# Patient Record
Sex: FEMALE | Race: WHITE | NOT HISPANIC OR LATINO | Employment: OTHER | URBAN - METROPOLITAN AREA
[De-identification: names, ages, dates, MRNs, and addresses within clinical notes are randomized per-mention and may not be internally consistent; named-entity substitution may affect disease eponyms.]

---

## 2017-08-02 ENCOUNTER — THERAPY VISIT (OUTPATIENT)
Dept: PHYSICAL THERAPY | Facility: CLINIC | Age: 67
End: 2017-08-02
Payer: MEDICARE

## 2017-08-02 DIAGNOSIS — M25.511 ACUTE PAIN OF RIGHT SHOULDER: ICD-10-CM

## 2017-08-02 PROCEDURE — 97161 PT EVAL LOW COMPLEX 20 MIN: CPT | Mod: GP | Performed by: PHYSICAL THERAPIST

## 2017-08-02 PROCEDURE — G8987 SELF CARE CURRENT STATUS: HCPCS | Mod: GP | Performed by: PHYSICAL THERAPIST

## 2017-08-02 PROCEDURE — 97110 THERAPEUTIC EXERCISES: CPT | Mod: GP | Performed by: PHYSICAL THERAPIST

## 2017-08-02 PROCEDURE — G8988 SELF CARE GOAL STATUS: HCPCS | Mod: GP | Performed by: PHYSICAL THERAPIST

## 2017-08-02 NOTE — LETTER
DEPARTMENT OF HEALTH AND HUMAN SERVICES  [facility]ARE & MEDICAID SERVICES    PLAN/UPDATED PLAN OF PROGRESS FOR OUTPATIENT REHABILITATION    PATIENTS NAME:  Mihaela Pro     : 1950    PROVIDER NUMBER:    [id number]    [facility]  [id number]    PROVIDER NAME: [facility]    MEDICAL RECORD NUMBER: [id number]     START OF CARE DATE:  SOC Date: 17     TYPE:  PT    PRIMARY/TREATMENT DIAGNOSIS: (Pertinent Medical Diagnosis)  Acute pain of right shoulder     VISITS FROM START OF CARE:  Rxs Used: 1     Subjective:  Patient is a 66 year old female presenting with rehab right shoulder hpi. The history is provided by the patient. No  was used.   Mihaela Pro is a 66 year old female with a right shoulder condition.  Condition occurred with:  Repetition/overuse (post surgical ).  Condition occurred: for unknown reasons.  This is a new condition  May 13, 2017.    Patient reports pain:  Lateral, anterior and medial.  Radiates to:  Shoulder.  Pain is described as aching and is intermittent and reported as 5/10.  Associated symptoms:  Loss of motion/stiffness, painful arc and loss of strength. Pain is worse during the night.  Symptoms are exacerbated by using arm behind back, using arm overhead, using arm at shoulder level, lifting, lying on extremity and carrying (limited use due to wearing a sling yet. ) and relieved by rest, ice and bracing/immobilizing.  Since onset symptoms are gradually improving.  Special tests:  X-ray and MRI.  Previous treatment includes physical therapy.  There was mild improvement following previous treatment.  General health as reported by patient is excellent and good.  Pertinent medical history includes:  Thyroid problems, fibromyalgia, high blood pressure, anemia and smoking.  Medical allergies: yes (latex/adhesive ).  Other surgeries include:  Orthopedic surgery.  Current medications:  Thyroid medication and high blood pressure medication.   Current occupation is Retired .      Barriers include:  None as reported by the patient.  Red flags:  None as reported by the patient.          Objective:  Standing Alignment:    Cervical/Thoracic:  Forward head  Shoulder/UE:  Rounded shoulders  Flexibility/Screens:   Negative screens: Cervical   Neurological: She is alert. She has normal strength and normal reflexes. No cranial nerve deficit or sensory deficit.   Shoulder Evaluation:  ROM:      PATIENTS NAME:  Mihaela Pro   : 1950    PROM:    Flexion:  Left:  170    Right: 40    Internal Rotation:  Left:  90    Right:  60  External Rotation:  Left:  90    Right:  30  Strength:  not assessed  Palpation:  Palpation assessed shoulder: very clean and healing well incision and surrounding area.   Right shoulder tenderness present at: Incisional       Assessment/Plan:    Patient is a 66 year old female with right side shoulder complaints.    Patient has the following significant findings with corresponding treatment plan.                Diagnosis 1:  R open massive RCR , GH debridement , bicep tenodesis       Therapy Evaluation Codes:   1) History comprised of:   Personal factors that impact the plan of care:      Age, Past/current experiences and rehab protocol assignement , also careful progression due to thyroid dysfunction .    Comorbidity factors that impact the plan of care are:      Fibromyalgia, High blood pressure and Smoking.     Medications impacting care: High blood pressure and thyroid and hormone replacement.  2) Examination of Body Systems comprised of:   Body structures and functions that impact the plan of care:      Elbow, Fingers, Hand and Shoulder.   Activity limitations that impact the plan of care are:      Bathing, Cooking, Driving, Dressing, Grasping, Lifting, Reading/Computer work, Sleeping and Laying down.  3) Clinical presentation characteristics are:   Stable/Uncomplicated.  4) Decision-Making    Low complexity using standardized  "patient assessment instrument and/or measureable assessment of functional outcome.  Cumulative Therapy Evaluation is: Low complexity.    Previous and current functional limitations:  (See Goal Flow Sheet for this information)    Short term and Long term goals: (See Goal Flow Sheet for this information)     Communication ability:  Patient appears to be able to clearly communicate and understand verbal and written communication and follow directions correctly.  Treatment Explanation - The following has been discussed with the patient:   RX ordered/plan of care  Anticipated outcomes  Possible risks and side effects  This patient would benefit from PT intervention to resume normal activities.   Rehab potential is good.    Frequency:  1 X week, once daily  Duration:  for 6 weeks tapering to 1 X a week, every other week  over 12 weeks  Discharge Plan:  Achieve all LTG.  Independent in home treatment program.  Reach maximal therapeutic benefit.          PATIENTS NAME:  Mihaela Pro   : 1950      Caregiver Signature/Credentials _____________________________ Date ________       Treating Provider: Shedlon Boone PT    I have reviewed and certified the need for these services and plan of treatment while under my care.        PHYSICIAN'S SIGNATURE:   _________________________________________  Date___________   Johnny Oakley MD    Certification period:  Beginning of Cert date period: 17 to  End of Cert period date: 10/30/17     Functional Level Progress Report: Please see attached \"Goal Flow sheet for Functional level.\"    ____X____ Continue Services or       ________ DC Services                Service dates: From  SOC Date: 17 date to present                         "

## 2017-08-09 ENCOUNTER — THERAPY VISIT (OUTPATIENT)
Dept: PHYSICAL THERAPY | Facility: CLINIC | Age: 67
End: 2017-08-09
Payer: MEDICARE

## 2017-08-09 DIAGNOSIS — M25.511 ACUTE PAIN OF RIGHT SHOULDER: Primary | ICD-10-CM

## 2017-08-09 PROCEDURE — G8987 SELF CARE CURRENT STATUS: HCPCS | Mod: GP | Performed by: PHYSICAL THERAPIST

## 2017-08-09 PROCEDURE — 97112 NEUROMUSCULAR REEDUCATION: CPT | Mod: GP | Performed by: PHYSICAL THERAPIST

## 2017-08-09 PROCEDURE — 97110 THERAPEUTIC EXERCISES: CPT | Mod: GP | Performed by: PHYSICAL THERAPIST

## 2017-08-09 PROCEDURE — G8988 SELF CARE GOAL STATUS: HCPCS | Mod: GP | Performed by: PHYSICAL THERAPIST

## 2017-08-09 NOTE — PROGRESS NOTES
Subjective:    Patient is a 66 year old female presenting with rehab right shoulder hpi. The history is provided by the patient. No  was used.   Mihaela Pro is a 66 year old female with a right shoulder condition.  Condition occurred with:  Repetition/overuse (post surgical ).  Condition occurred: for unknown reasons.  This is a new condition  May 13, 2017.    Patient reports pain:  Lateral, anterior and medial.  Radiates to:  Shoulder.  Pain is described as aching and is intermittent and reported as 5/10.  Associated symptoms:  Loss of motion/stiffness, painful arc and loss of strength. Pain is worse during the night.  Symptoms are exacerbated by using arm behind back, using arm overhead, using arm at shoulder level, lifting, lying on extremity and carrying (limited use due to wearing a sling yet. ) and relieved by rest, ice and bracing/immobilizing.  Since onset symptoms are gradually improving.  Special tests:  X-ray and MRI.  Previous treatment includes physical therapy.  There was mild improvement following previous treatment.  General health as reported by patient is excellent and good.  Pertinent medical history includes:  Thyroid problems, fibromyalgia, high blood pressure, anemia and smoking.  Medical allergies: yes (latex/adhesive ).  Other surgeries include:  Orthopedic surgery.  Current medications:  Thyroid medication and high blood pressure medication.  Current occupation is Retired .        Barriers include:  None as reported by the patient.    Red flags:  None as reported by the patient.                        Objective:    Standing Alignment:    Cervical/Thoracic:  Forward head  Shoulder/UE:  Rounded shoulders                  Flexibility/Screens:   Negative screens: Cervical           Neurological: She is alert. She has normal strength and normal reflexes. No cranial nerve deficit or sensory deficit.                      Shoulder Evaluation:  ROM:    PROM:    Flexion:  Left:   170    Right: 40          Internal Rotation:  Left:  90    Right:  60  External Rotation:  Left:  90    Right:  30                    Strength:  not assessed                          Palpation:  Palpation assessed shoulder: very clean and healing well incision and surrounding area.     Right shoulder tenderness present at: Incisional                                     General     ROS    Assessment/Plan:      Patient is a 66 year old female with right side shoulder complaints.    Patient has the following significant findings with corresponding treatment plan.                Diagnosis 1:  R open massive RCR , GH debridement , bicep tenodesis       Therapy Evaluation Codes:   1) History comprised of:   Personal factors that impact the plan of care:      Age, Past/current experiences and rehab protocol assignement , also careful progression due to thyroid dysfunction .    Comorbidity factors that impact the plan of care are:      Fibromyalgia, High blood pressure and Smoking.     Medications impacting care: High blood pressure and thyroid and hormone replacement.  2) Examination of Body Systems comprised of:   Body structures and functions that impact the plan of care:      Elbow, Fingers, Hand and Shoulder.   Activity limitations that impact the plan of care are:      Bathing, Cooking, Driving, Dressing, Grasping, Lifting, Reading/Computer work, Sleeping and Laying down.  3) Clinical presentation characteristics are:   Stable/Uncomplicated.  4) Decision-Making    Low complexity using standardized patient assessment instrument and/or measureable assessment of functional outcome.  Cumulative Therapy Evaluation is: Low complexity.    Previous and current functional limitations:  (See Goal Flow Sheet for this information)    Short term and Long term goals: (See Goal Flow Sheet for this information)     Communication ability:  Patient appears to be able to clearly communicate and understand verbal and written  communication and follow directions correctly.  Treatment Explanation - The following has been discussed with the patient:   RX ordered/plan of care  Anticipated outcomes  Possible risks and side effects  This patient would benefit from PT intervention to resume normal activities.   Rehab potential is good.    Frequency:  1 X week, once daily  Duration:  for 6 weeks tapering to 1 X a week, every other week  over 12 weeks  Discharge Plan:  Achieve all LTG.  Independent in home treatment program.  Reach maximal therapeutic benefit.    Please refer to the daily flowsheet for treatment today, total treatment time and time spent performing 1:1 timed codes.

## 2017-08-16 ENCOUNTER — THERAPY VISIT (OUTPATIENT)
Dept: PHYSICAL THERAPY | Facility: CLINIC | Age: 67
End: 2017-08-16
Payer: MEDICARE

## 2017-08-16 DIAGNOSIS — M25.511 ACUTE PAIN OF RIGHT SHOULDER: Primary | ICD-10-CM

## 2017-08-16 PROCEDURE — G8988 SELF CARE GOAL STATUS: HCPCS | Mod: GP | Performed by: PHYSICAL THERAPIST

## 2017-08-16 PROCEDURE — G8987 SELF CARE CURRENT STATUS: HCPCS | Mod: GP | Performed by: PHYSICAL THERAPIST

## 2017-08-16 PROCEDURE — 97110 THERAPEUTIC EXERCISES: CPT | Mod: GP | Performed by: PHYSICAL THERAPIST

## 2017-08-23 ENCOUNTER — THERAPY VISIT (OUTPATIENT)
Dept: PHYSICAL THERAPY | Facility: CLINIC | Age: 67
End: 2017-08-23
Payer: MEDICARE

## 2017-08-23 DIAGNOSIS — M25.511 ACUTE PAIN OF RIGHT SHOULDER: Primary | ICD-10-CM

## 2017-08-23 PROCEDURE — G8987 SELF CARE CURRENT STATUS: HCPCS | Mod: GP | Performed by: PHYSICAL THERAPIST

## 2017-08-23 PROCEDURE — G8988 SELF CARE GOAL STATUS: HCPCS | Mod: GP | Performed by: PHYSICAL THERAPIST

## 2017-08-23 PROCEDURE — 97112 NEUROMUSCULAR REEDUCATION: CPT | Mod: GP | Performed by: PHYSICAL THERAPIST

## 2017-08-23 PROCEDURE — 97110 THERAPEUTIC EXERCISES: CPT | Mod: GP | Performed by: PHYSICAL THERAPIST

## 2017-08-30 ENCOUNTER — THERAPY VISIT (OUTPATIENT)
Dept: PHYSICAL THERAPY | Facility: CLINIC | Age: 67
End: 2017-08-30
Payer: MEDICARE

## 2017-08-30 DIAGNOSIS — M25.511 ACUTE PAIN OF RIGHT SHOULDER: Primary | ICD-10-CM

## 2017-08-30 PROCEDURE — G8988 SELF CARE GOAL STATUS: HCPCS | Mod: GP | Performed by: PHYSICAL THERAPIST

## 2017-08-30 PROCEDURE — G8987 SELF CARE CURRENT STATUS: HCPCS | Mod: GP | Performed by: PHYSICAL THERAPIST

## 2017-08-30 PROCEDURE — 97110 THERAPEUTIC EXERCISES: CPT | Mod: GP | Performed by: PHYSICAL THERAPIST

## 2017-08-30 PROCEDURE — 97112 NEUROMUSCULAR REEDUCATION: CPT | Mod: GP | Performed by: PHYSICAL THERAPIST

## 2017-10-05 ENCOUNTER — THERAPY VISIT (OUTPATIENT)
Dept: PHYSICAL THERAPY | Facility: CLINIC | Age: 67
End: 2017-10-05
Payer: MEDICARE

## 2017-10-05 DIAGNOSIS — M25.511 ACUTE PAIN OF RIGHT SHOULDER: Primary | ICD-10-CM

## 2017-10-05 PROCEDURE — 97110 THERAPEUTIC EXERCISES: CPT | Mod: GP | Performed by: PHYSICAL THERAPIST

## 2017-10-05 PROCEDURE — 97112 NEUROMUSCULAR REEDUCATION: CPT | Mod: GP | Performed by: PHYSICAL THERAPIST

## 2017-10-05 PROCEDURE — G8988 SELF CARE GOAL STATUS: HCPCS | Mod: GP | Performed by: PHYSICAL THERAPIST

## 2017-10-05 PROCEDURE — G8987 SELF CARE CURRENT STATUS: HCPCS | Mod: GP | Performed by: PHYSICAL THERAPIST

## 2017-10-23 NOTE — PROGRESS NOTES
Subjective:    HPI                    Objective:    System    Physical Exam    General     ROS    Assessment/Plan:      PROGRESS  REPORT    Progress reporting period is from AUg 9, 2017 to Oct 5, 2017.     SUBJECTIVE   Patient is near pain free post op R TSA . Returns from seeing her MD . Recommended to carry on with PT every 2-3 weeks for HEP management and progression of functional strength/ROM based on Protocol . 5 months post op.    Current Pain level: 1/10   Initial Pain level: 7/10   Changes in function: Yes, see goal flow sheet for change in function   Adverse reactions: None;   ,         OBJECTIVE  Objective: ER 55, IR 80, 150 Flexion . AROM improving 5-10 degrees every couple weeks. Functionally, gradual gains and using her arm with day to day activity, but not terminal reach or lifting greater than a few pounds. Follow up with MD in 6-12 weeks again.       ASSESSMENT/PLAN  Updated problem list and treatment plan: Diagnosis 1:  R post op TSA     STG/LTGs have been met or progress has been made towards goals:  Yes (See Goal flow sheet completed today.)  Assessment of Progress: The patient's condition is improving.  The patient's condition has potential to improve.  Self Management Plans:  Patient has been instructed in a home treatment program.        Recommendations:  This patient would benefit from continued therapy.     Frequency:  1 X week,  Every 2 weeks once daily  Duration:  for 12 weeks          Please refer to the daily flowsheet for treatment today, total treatment time and time spent performing 1:1 timed codes.

## 2019-04-03 ENCOUNTER — THERAPY VISIT (OUTPATIENT)
Dept: PHYSICAL THERAPY | Facility: CLINIC | Age: 69
End: 2019-04-03
Payer: MEDICARE

## 2019-04-03 PROCEDURE — 97161 PT EVAL LOW COMPLEX 20 MIN: CPT | Mod: GP | Performed by: PHYSICAL THERAPIST

## 2019-04-03 PROCEDURE — 97110 THERAPEUTIC EXERCISES: CPT | Mod: GP | Performed by: PHYSICAL THERAPIST

## 2019-04-03 PROCEDURE — G8987 SELF CARE CURRENT STATUS: HCPCS | Mod: GP | Performed by: PHYSICAL THERAPIST

## 2019-04-03 PROCEDURE — 97112 NEUROMUSCULAR REEDUCATION: CPT | Mod: GP | Performed by: PHYSICAL THERAPIST

## 2019-04-03 PROCEDURE — G8988 SELF CARE GOAL STATUS: HCPCS | Mod: GP | Performed by: PHYSICAL THERAPIST

## 2019-04-03 NOTE — LETTER
[facility]  [address]  [place] [address]  [phone]    2019    Re: Mihaela Pro   :   1950  MRN:  [id number]   REFERRING PHYSICIAN:   [facility]    Date of Initial Evaluation:  4/3/19  Visits:  Rxs Used: 1  Reason for Referral:  Chronic left shoulder pain    [facility] Initial Evaluation  Subjective:  SPADI 76/100  The history is provided by the patient. No  was used.   Mihaela Pro is a 68 year old female with a left shoulder condition.  Condition occurred with:  Repetition/overuse and unknown cause.  Condition occurred: for unknown reasons.  This is a chronic condition  3/15/2019.    Patient reports pain:  In the joint, medial, lateral, anterior and upper arm.  Radiates to:  Upper arm.  Pain is described as aching and is intermittent and reported as 7/10.  Associated symptoms:  Painful arc, loss of strength and loss of motion/stiffness. Pain is worse during the night.  Symptoms are exacerbated by lifting, lying on extremity, carrying, certain positions, using arm overhead, using arm behind back and using arm at shoulder level and relieved by rest, ice and heat.  Since onset symptoms are gradually worsening.  Special tests:  X-ray.      General health as reported by patient is good.  Pertinent medical history includes:  Implaned devices and fibromyalgia (Right total shoulder replacement).  Medical allergies: yes (Latex and adhesive).  Other surgeries include:  Orthopedic surgery (Right shoulder replacement).  Current medications:  Thyroid medication and high blood pressure medication.  Current occupation is Retired.    Barriers include:  None as reported by the patient.  Red flags:  None as reported by the patient.    Objective:  Standing Alignment:    Cervical/Thoracic:  Normal  Shoulder/UE:  Normal    Gait:    Gait Type:  Normal     Flexibility/Screens:   Positive screens:   Cervical  Neurological: She is alert. She has normal strength and normal reflexes. No cranial nerve deficit or sensory deficit.     Shoulder Evaluation:  ROM:    PROM:    Flexion:  Left:  150    Right: 150    Internal Rotation:  Left:  90    Right:  90  External Rotation:  Left:  80    Right:  60    Strength:  not assessed    Special Tests:    Left shoulder positive for the following special tests:  Impingement    Palpation:  not assessed    Assessment/Plan:    Patient is a 68 year old female with left side shoulder complaints.    Patient has the following significant findings with corresponding treatment plan.                Diagnosis 1: Left shoulder impingement.      Therapy Evaluation Codes:   1) History comprised of:   Personal factors that impact the plan of care:      History of osteoarthritis.    Comorbidity factors that impact the plan of care are:      High blood pressure, Osteoarthritis and Thyroid dysfunction.     Medications impacting care: High blood pressure and Thyroid medication.  2) Examination of Body Systems comprised of:   Body structures and functions that impact the plan of care:      Shoulder.   Activity limitations that impact the plan of care are:      Bathing, Cooking, Driving, Dressing, Lifting and Sleeping.  3) Clinical presentation characteristics are:   Stable/Uncomplicated.  4) Decision-Making    Low complexity using standardized patient assessment instrument and/or measureable assessment of functional outcome.    Cumulative Therapy Evaluation is: Low complexity.  Previous and current functional limitations:  (See Goal Flow Sheet for this information)    Short term and Long term goals: (See Goal Flow Sheet for this information)   Communication ability:  Patient appears to be able to clearly communicate and understand verbal and written communication and follow directions correctly.  Treatment Explanation - The following has been discussed with the patient:   RX ordered/plan of  care  Anticipated outcomes  Possible risks and side effects  This patient would benefit from PT intervention to resume normal activities.   Rehab potential is good.    Frequency:  1 X week, once daily  Duration:  for 6 weeks  Discharge Plan:  Achieve all LTG.  Independent in home treatment program.  Reach maximal therapeutic benefit.    Rehabilitation plan; sports and orthopedic specialist upper extremity conservative impingement program emphasizing scapular stabilization gentle range of motion and endurance strength for rotator cuff.    Thank you for your referral.    INQUIRIES  Therapist: Sheldon Boone, PT, ATC, Cert. MDT  [facility]  [address]  [place] [address]  Phone: [phone]  Fax: [phone]

## 2019-04-06 NOTE — PROGRESS NOTES
[facility] Initial Evaluation  Subjective:  SPADI 76/100      The history is provided by the patient. No  was used.   Mihaela Pro is a 68 year old female with a left shoulder condition.  Condition occurred with:  Repetition/overuse and unknown cause.  Condition occurred: for unknown reasons.  This is a chronic condition  3/15/2019.    Patient reports pain:  In the joint, medial, lateral, anterior and upper arm.  Radiates to:  Upper arm.  Pain is described as aching and is intermittent and reported as 7/10.  Associated symptoms:  Painful arc, loss of strength and loss of motion/stiffness. Pain is worse during the night.  Symptoms are exacerbated by lifting, lying on extremity, carrying, certain positions, using arm overhead, using arm behind back and using arm at shoulder level and relieved by rest, ice and heat.  Since onset symptoms are gradually worsening.  Special tests:  X-ray.      General health as reported by patient is good.  Pertinent medical history includes:  Implaned devices and fibromyalgia (Right total shoulder replacement).  Medical allergies: yes (Latex and adhesive).  Other surgeries include:  Orthopedic surgery (Right shoulder replacement).  Current medications:  Thyroid medication and high blood pressure medication.  Current occupation is Retired.        Barriers include:  None as reported by the patient.    Red flags:  None as reported by the patient.                        Objective:  Standing Alignment:    Cervical/Thoracic:  Normal  Shoulder/UE:  Normal              Gait:    Gait Type:  Normal         Flexibility/Screens:   Positive screens:  Cervical          Neurological: She is alert. She has normal strength and normal reflexes. No cranial nerve deficit or sensory deficit.                      Shoulder Evaluation:  ROM:    PROM:    Flexion:  Left:  150    Right: 150          Internal Rotation:  Left:  90    Right:  90  External Rotation:  Left:  80     Right:  60                    Strength:  not assessed                        Special Tests:    Left shoulder positive for the following special tests:  Impingement    Palpation:  not assessed                                         General     ROS    Assessment/Plan:    Patient is a 68 year old female with left side shoulder complaints.    Patient has the following significant findings with corresponding treatment plan.                Diagnosis 1: Left shoulder impingement.      Therapy Evaluation Codes:   1) History comprised of:   Personal factors that impact the plan of care:      History of osteoarthritis.    Comorbidity factors that impact the plan of care are:      High blood pressure, Osteoarthritis and Thyroid dysfunction.     Medications impacting care: High blood pressure and Thyroid medication.  2) Examination of Body Systems comprised of:   Body structures and functions that impact the plan of care:      Shoulder.   Activity limitations that impact the plan of care are:      Bathing, Cooking, Driving, Dressing, Lifting and Sleeping.  3) Clinical presentation characteristics are:   Stable/Uncomplicated.  4) Decision-Making    Low complexity using standardized patient assessment instrument and/or measureable assessment of functional outcome.  Cumulative Therapy Evaluation is: Low complexity.    Previous and current functional limitations:  (See Goal Flow Sheet for this information)    Short term and Long term goals: (See Goal Flow Sheet for this information)     Communication ability:  Patient appears to be able to clearly communicate and understand verbal and written communication and follow directions correctly.  Treatment Explanation - The following has been discussed with the patient:   RX ordered/plan of care  Anticipated outcomes  Possible risks and side effects  This patient would benefit from PT intervention to resume normal activities.   Rehab potential is good.    Frequency:  1 X week, once  daily  Duration:  for 6 weeks  Discharge Plan:  Achieve all LTG.  Independent in home treatment program.  Reach maximal therapeutic benefit.    Rehabilitation plan; sports and orthopedic specialist upper extremity conservative impingement program emphasizing scapular stabilization gentle range of motion and endurance strength for rotator cuff.    Please refer to the daily flowsheet for treatment today, total treatment time and time spent performing 1:1 timed codes.

## 2019-05-15 ENCOUNTER — OFFICE VISIT (OUTPATIENT)
Dept: PLASTIC SURGERY | Facility: CLINIC | Age: 69
End: 2019-05-15

## 2019-05-15 DIAGNOSIS — H02.135 SENILE ECTROPION OF LEFT LOWER EYELID: Primary | ICD-10-CM

## 2019-05-15 NOTE — LETTER
May 15, 2019  Re: Mihaela Pro  1950    Dear Dr. Meza,    Thank you so much for referring Mihaela Pro to the [facility]. I had the pleasure of visiting with Mihaela today.     Attached you will find a copy of my note. Please feel free to reach out to me with any questions, [phone].     This office note has been dictated.       Your trust in our practice and care is much appreciated.    Sincerely,  GURMEET CARRERO MD

## 2019-05-15 NOTE — LETTER
5/15/2019      RE: Mihaela Pro  [address] [facility] [address]     Service Date: 05/15/2019      HISTORY OF PRESENT ILLNESS:  Ms. Pro is in today.  She is concerned about some irregularity in the skin around her left lower lid that now feels tight and there is a pulling sensation.  She does not have any specific serious dry eye symptomatology but there is some irritation laterally.  Unfortunately, her   two years ago in his sleep.  She has been overall pleased with her upper and lower lid blepharoplasty that we performed more than a decade ago.        PHYSICAL EXAMINATION:  Our examination shows a fit, remarkably youthful appearing woman of 69.  She has Johnson I skin.  She has very fine, crepey lower eyelid skin. On the left side, there is a subtle pleat that extends from 5 mm medial to the lateral canthus down across the lid and down into the upper cheek region.  Her snap back test is good and her lid distraction test is normal with a downward traction as she has early ectropion.        ASSESSMENT AND PLAN:  I think a skin flap, lower lid blepharoplasty, with a lid tightening procedure would be an appropriate intervention for her to treat the ectropion.  She will find a time with our staff to schedule the surgery.  Photos were taken today.         GURMEET CARRERO MD             D: 2019   T: 2019   MT: ms      Name:     MIHAELA PRO   MRN:      [id number]        Account:      [id number]   :      1950           Service Date: 05/15/2019      Document: [id number]

## 2019-05-15 NOTE — NURSING NOTE
Chief Complaint   Patient presents with     Consult     lower eyelid pulling     Obtained photo documentation.  Provided patient with a cosmetic quote for an ectropian repair for the Left lower eyelid. Reviewed need for a  and adult to stay with her 24 hours after surgery as well as the need for a pre op physical within 30 days of surgery.  She has a couple other operations she is doing this year so will check her schedule to see what works best and let us know.  Bernadette Ochoa, Patient Coordinator

## 2019-05-16 NOTE — PROGRESS NOTES
Service Date: 05/15/2019      HISTORY OF PRESENT ILLNESS:  Ms. Pro is in today.  She is concerned about some irregularity in the skin around her left lower lid that now feels tight and there is a pulling sensation.  She does not have any specific serious dry eye symptomatology but there is some irritation laterally.  Unfortunately, her   two years ago in his sleep.  She has been overall pleased with her upper and lower lid blepharoplasty that we performed more than a decade ago.        PHYSICAL EXAMINATION:  Our examination shows a fit, remarkably youthful appearing woman of 69.  She has Johnson I skin.  She has very fine, crepey lower eyelid skin. On the left side, there is a subtle pleat that extends from 5 mm medial to the lateral canthus down across the lid and down into the upper cheek region.  Her snap back test is good and her lid distraction test is normal with a downward traction as she has early ectropion.        ASSESSMENT AND PLAN:  I think a skin flap, lower lid blepharoplasty, with a lid tightening procedure would be an appropriate intervention for her to treat the ectropion.  She will find a time with our staff to schedule the surgery.  Photos were taken today.         GURMEET CARRERO MD             D: 2019   T: 2019   MT: ms      Name:     ANALI PRO   MRN:      [id number]        Account:      [id number]   :      1950           Service Date: 05/15/2019      Document: [id number]

## 2019-06-13 ENCOUNTER — OFFICE VISIT (OUTPATIENT)
Dept: PLASTIC SURGERY | Facility: CLINIC | Age: 69
End: 2019-06-13

## 2019-06-13 DIAGNOSIS — Z98.890 POSTOPERATIVE STATE: Primary | ICD-10-CM

## 2019-06-13 NOTE — LETTER
2019      RE: Mihaela Pro  [address] [facility] [address]       This office note has been dictated. This office note has been dictated.     Service Date: 2019      HISTORY OF PRESENT ILLNESS:  Ms. Pro is back today.        PHYSICAL EXAMINATION:  Her sutures are out.  The lid is in good position.  She wanted some advice about blepharitis.  She is using some dilute baby shampoo and I have no other more profound tricks than that and that.        PLAN:  I am going to ask that she see her ophthalmologist.  She is going to see a dermatologist next week.  If they have any tricks, they will share them with us.  Her lid is slightly over corrected, which is exactly what we wanted.  It is in good position against the globe and the sense of tightness is markedly relieved.  She will see us again in a month or so.         GURMEET CARRERO MD           D: 2019   T: 2019   MT: ms      Name:     MIHAELA PRO   MRN:      [id number]        Account:      [id number]   :      1950           Service Date: 2019      Document: [id number]

## 2019-06-13 NOTE — LETTER
June 13, 2019  Re: Mihaela Pro  1950    Dear Dr. Meza,    Thank you so much for referring Mihaela Pro to the [facility]. I had the pleasure of visiting with Mihaela today.     Attached you will find a copy of my note. Please feel free to reach out to me with any questions, [phone].     This office note has been dictated. This office note has been dictated.       Your trust in our practice and care is much appreciated.    Sincerely,  GURMEET CARRERO MD

## 2019-06-13 NOTE — LETTER
6/13/2019       RE: Mihaela Pro  [address] [facility] [place] [address]     Dear Colleague,    Thank you for referring your patient, Mihaela Pro, to the [facility] at [facility]. Please see a copy of my visit note below.    This office note has been dictated. This office note has been dictated.     Again, thank you for allowing me to participate in the care of your patient.      Sincerely,    GURMEET CARRERO MD

## 2019-06-14 NOTE — PROGRESS NOTES
Service Date: 2019      HISTORY OF PRESENT ILLNESS:  Ms. Pro is back today.        PHYSICAL EXAMINATION:  Her sutures are out.  The lid is in good position.  She wanted some advice about blepharitis.  She is using some dilute baby shampoo and I have no other more profound tricks than that and that.        PLAN:  I am going to ask that she see her ophthalmologist.  She is going to see a dermatologist next week.  If they have any tricks, they will share them with us.  Her lid is slightly over corrected, which is exactly what we wanted.  It is in good position against the globe and the sense of tightness is markedly relieved.  She will see us again in a month or so.         GURMEET CARRERO MD             D: 2019   T: 2019   MT: ms      Name:     ANALI PRO   MRN:      [id number]        Account:      [id number]   :      1950           Service Date: 2019      Document: [id number]

## 2019-07-03 NOTE — NURSING NOTE
Chief Complaint   Patient presents with     Post-op Visit     ectropion repair     Patient to follow up in 1 month with .   Bernadette Ochoa, Patient Coordinator

## 2019-08-22 ENCOUNTER — OFFICE VISIT (OUTPATIENT)
Dept: PLASTIC SURGERY | Facility: CLINIC | Age: 69
End: 2019-08-22

## 2019-08-22 DIAGNOSIS — Z98.890 POSTOPERATIVE STATE: Primary | ICD-10-CM

## 2019-08-22 NOTE — LETTER
August 22, 2019  Re: Mihaela Pro  1950    Dear Dr. Meza,    Thank you so much for referring Mihaela Pro to the [facility]. I had the pleasure of visiting with Mihaela today.     Attached you will find a copy of my note. Please feel free to reach out to me with any questions, [phone].       This office note has been dictated.      Your trust in our practice and care is much appreciated.    Sincerely,  GURMEET CARRERO MD

## 2019-08-22 NOTE — LETTER
2019       RE: Mihaela Pro  [address] [facility] [address]     Dear Colleague,    Thank you for referring your patient, Mihaela Pro, to the [facility] at [facility]. Please see a copy of my visit note below.    Service Date: 2019      REASON FOR VISIT:  Ms. Grewal is in today.        PHYSICAL EXAMINATION:  Her lids have excellent posture.  She notes a little nodularity near the left lateral canthus.  I think it is still an area where the tarsal strip was carried out.  I have had a few people make an epidermal inclusion cyst in that area, but I want it to settle further. I would not do anything more than have the area massaged gently.       ASSESSMENT AND PLAN:  She will follow up with us in the spring.         GURMEET CARRERO MD       D: 2019   T: 2019   MT: ms      Name:     MIHAELA PRO   MRN:      [id number]        Account:      [id number]   :      1950           Service Date: 2019      Document: [id number]

## 2019-08-22 NOTE — LETTER
8/22/2019       RE: Mihaela Pro  [address] [facility] [address]     Dear Colleague,    Thank you for referring your patient, Mihaela Pro, to the [facility] at [facility]. Please see a copy of my visit note below.      This office note has been dictated.    Again, thank you for allowing me to participate in the care of your patient.      Sincerely,    GURMEET CARRERO MD

## 2019-08-27 NOTE — PROGRESS NOTES
Service Date: 2019      REASON FOR VISIT:  Ms. Grewal is in today.        PHYSICAL EXAMINATION:  Her lids have excellent posture.  She notes a little nodularity near the left lateral canthus.  I think it is still an area where the tarsal strip was carried out.  I have had a few people make an epidermal inclusion cyst in that area, but I want it to settle further. I would not do anything more than have the area massaged gently.       ASSESSMENT AND PLAN:  She will follow up with us in the spring.         GURMEET CARRERO MD             D: 2019   T: 2019   MT: ms      Name:     ANALI ALEXANDRE   MRN:      [id number]        Account:      [id number]   :      1950           Service Date: 2019      Document: [id number]

## 2019-09-05 NOTE — NURSING NOTE
Chief Complaint   Patient presents with     Post-op Visit     ectropian repair     Patient had her questions all answered today is was feeling good about her outcome after this visit.  She will follow up in 3 months with Dr. Butcher. Bernadette Ochoa, Patient Coordinator

## 2021-05-24 ENCOUNTER — RECORDS - HEALTHEAST (OUTPATIENT)
Dept: ADMINISTRATIVE | Facility: CLINIC | Age: 71
End: 2021-05-24

## 2021-05-26 ENCOUNTER — RECORDS - HEALTHEAST (OUTPATIENT)
Dept: ADMINISTRATIVE | Facility: CLINIC | Age: 71
End: 2021-05-26

## 2021-05-27 ENCOUNTER — RECORDS - HEALTHEAST (OUTPATIENT)
Dept: ADMINISTRATIVE | Facility: CLINIC | Age: 71
End: 2021-05-27

## 2021-07-13 ENCOUNTER — RECORDS - HEALTHEAST (OUTPATIENT)
Dept: ADMINISTRATIVE | Facility: CLINIC | Age: 71
End: 2021-07-13

## 2021-07-21 ENCOUNTER — RECORDS - HEALTHEAST (OUTPATIENT)
Dept: ADMINISTRATIVE | Facility: CLINIC | Age: 71
End: 2021-07-21

## 2021-07-22 ENCOUNTER — RECORDS - HEALTHEAST (OUTPATIENT)
Dept: SCHEDULING | Facility: CLINIC | Age: 71
End: 2021-07-22

## 2021-07-22 DIAGNOSIS — Z12.31 OTHER SCREENING MAMMOGRAM: ICD-10-CM

## 2023-06-22 ENCOUNTER — OFFICE VISIT (OUTPATIENT)
Dept: FAMILY MEDICINE | Facility: CLINIC | Age: 73
End: 2023-06-22
Payer: MEDICARE

## 2023-06-22 VITALS
HEART RATE: 57 BPM | OXYGEN SATURATION: 96 % | SYSTOLIC BLOOD PRESSURE: 150 MMHG | BODY MASS INDEX: 23 KG/M2 | RESPIRATION RATE: 16 BRPM | TEMPERATURE: 97.9 F | WEIGHT: 143.1 LBS | DIASTOLIC BLOOD PRESSURE: 100 MMHG | HEIGHT: 66 IN

## 2023-06-22 DIAGNOSIS — E55.9 VITAMIN D DEFICIENCY: ICD-10-CM

## 2023-06-22 DIAGNOSIS — Z00.00 HEALTH CARE MAINTENANCE: Primary | ICD-10-CM

## 2023-06-22 DIAGNOSIS — I10 ESSENTIAL HYPERTENSION: ICD-10-CM

## 2023-06-22 DIAGNOSIS — D12.6 TUBULAR ADENOMA OF COLON: ICD-10-CM

## 2023-06-22 DIAGNOSIS — R42 DIZZINESS: ICD-10-CM

## 2023-06-22 DIAGNOSIS — Z13.228 ENCOUNTER FOR SCREENING FOR OTHER METABOLIC DISORDERS: ICD-10-CM

## 2023-06-22 PROBLEM — M47.816 SPONDYLOSIS OF LUMBAR REGION WITHOUT MYELOPATHY OR RADICULOPATHY: Status: ACTIVE | Noted: 2017-05-30

## 2023-06-22 PROBLEM — I77.810 ASCENDING AORTA DILATION (H): Status: ACTIVE | Noted: 2018-05-03

## 2023-06-22 PROBLEM — H90.3 SENSORINEURAL HEARING LOSS OF BOTH EARS: Status: ACTIVE | Noted: 2022-06-15

## 2023-06-22 PROBLEM — M19.012 DEGENERATIVE JOINT DISEASE OF LEFT ACROMIOCLAVICULAR JOINT: Status: ACTIVE | Noted: 2019-03-15

## 2023-06-22 PROBLEM — M19.011 PRIMARY OSTEOARTHRITIS OF RIGHT SHOULDER: Status: ACTIVE | Noted: 2019-04-01

## 2023-06-22 PROBLEM — M85.80 OSTEOPENIA: Status: ACTIVE | Noted: 2023-06-22

## 2023-06-22 PROBLEM — M67.922 TENDINOPATHY OF LEFT BICEPS TENDON: Status: ACTIVE | Noted: 2019-04-01

## 2023-06-22 PROBLEM — M79.7 FIBROMYALGIA: Status: ACTIVE | Noted: 2023-06-22

## 2023-06-22 PROBLEM — M20.5X1 HALLUX LIMITUS, RIGHT: Status: ACTIVE | Noted: 2021-01-24

## 2023-06-22 PROBLEM — Z48.89 AFTERCARE FOLLOWING SURGERY: Status: ACTIVE | Noted: 2017-06-26

## 2023-06-22 PROCEDURE — G0439 PPPS, SUBSEQ VISIT: HCPCS | Performed by: FAMILY MEDICINE

## 2023-06-22 RX ORDER — LEVOTHYROXINE, LIOTHYRONINE 9.5; 2.25 UG/1; UG/1
15 TABLET ORAL
COMMUNITY
Start: 2023-05-02

## 2023-06-22 RX ORDER — CLOBETASOL PROPIONATE 0.5 MG/G
OINTMENT TOPICAL
COMMUNITY
Start: 2022-09-29

## 2023-06-22 RX ORDER — AMLODIPINE BESYLATE 2.5 MG/1
TABLET ORAL
COMMUNITY
Start: 2023-05-07 | End: 2023-11-22 | Stop reason: DRUGHIGH

## 2023-06-22 RX ORDER — TRAZODONE HYDROCHLORIDE 50 MG/1
50 TABLET, FILM COATED ORAL PRN
COMMUNITY

## 2023-06-22 RX ORDER — LOSARTAN POTASSIUM 100 MG/1
TABLET ORAL
COMMUNITY
Start: 2023-05-08

## 2023-06-22 ASSESSMENT — ENCOUNTER SYMPTOMS
DIARRHEA: 0
BREAST MASS: 0
FREQUENCY: 0
HEADACHES: 0
PARESTHESIAS: 0
ARTHRALGIAS: 0
SHORTNESS OF BREATH: 0
HEARTBURN: 0
NAUSEA: 0
FEVER: 0
COUGH: 0
MYALGIAS: 1
CONSTIPATION: 0
DIZZINESS: 1
ABDOMINAL PAIN: 0
HEMATOCHEZIA: 0
EYE PAIN: 0
HEMATURIA: 0
JOINT SWELLING: 0
NERVOUS/ANXIOUS: 0
DYSURIA: 0
CHILLS: 0
PALPITATIONS: 0
SORE THROAT: 0
WEAKNESS: 1

## 2023-06-22 ASSESSMENT — ACTIVITIES OF DAILY LIVING (ADL): CURRENT_FUNCTION: NO ASSISTANCE NEEDED

## 2023-06-22 NOTE — PROGRESS NOTES
"SUBJECTIVE:   Mihaela is a 72 year old who presents for Preventive Visit.      6/22/2023     2:24 PM   Additional Questions   Roomed by XL     Are you in the first 12 months of your Medicare coverage?  No    Healthy Habits:     In general, how would you rate your overall health?  Good    Frequency of exercise:  2-3 days/week    Duration of exercise:  Less than 15 minutes    Do you usually eat at least 4 servings of fruit and vegetables a day, include whole grains    & fiber and avoid regularly eating high fat or \"junk\" foods?  Yes    Taking medications regularly:  Yes    Ability to successfully perform activities of daily living:  No assistance needed    Home Safety:  No safety concerns identified    Hearing Impairment:  Difficulty following a conversation in a noisy restaurant or crowded room, feel that people are mumbling or not speaking clearly, difficulty following dialogue in the theater, difficult to understand a speaker at a public meeting or Protestant service, need to ask people to speak up or repeat themselves, find that men's voices are easier to understand than woman's, difficulty understanding soft or whispered speech and difficulty understanding speech on the telephone    In the past 6 months, have you been bothered by leaking of urine? Yes    In general, how would you rate your overall mental or emotional health?  Good      PHQ-2 Total Score: 0    Additional concerns today:  No        Have you ever done Advance Care Planning? (For example, a Health Directive, POLST, or a discussion with a medical provider or your loved ones about your wishes): Yes, patient states has an Advance Care Planning document and will bring a copy to the clinic.       Fall risk  Fallen 2 or more times in the past year?: No  Any fall with injury in the past year?: No    Cognitive Screening   1) Repeat 3 items (Leader, Season, Table)  Normal  2) Clock draw: NORMAL  3) 3 item recall: Recalls 3 objects  Results: 3 items recalled: " COGNITIVE IMPAIRMENT LESS LIKELY    Mini-CogTM Copyright DESHAWN Mcbride. Licensed by the author for use in [facility]; reprinted with permission (ping@.[facility]). All rights reserved.          Reviewed and updated as needed this visit by clinical staff   Tobacco   Meds              Reviewed and updated as needed this visit by Provider                 Social History     Tobacco Use     Smoking status: Some Days     Types: Cigarettes     Smokeless tobacco: Never   Substance Use Topics     Alcohol use: Not on file           6/22/2023     2:05 PM   Alcohol Use   Prescreen: >3 drinks/day or >7 drinks/week? No     Do you have a current opioid prescription? No  Do you use any other controlled substances or medications that are not prescribed by a provider? None          Current providers sharing in care for this patient include:   Patient Care Team:  Kellie Sosa as PCP - General (Internal Medicine)    The following health maintenance items are reviewed in Epic and correct as of today:  Health Maintenance   Topic Date Due     DEXA  Never done     TSH W/FREE T4 REFLEX  Never done     DTAP/TDAP/TD IMMUNIZATION (1 - Tdap) Never done     LIPID  Never done     LUNG CANCER SCREENING  Never done     COVID-19 Vaccine (5 - Pfizer series) 05/28/2022     MEDICARE ANNUAL WELLNESS VISIT  07/07/2022     HEPATITIS C SCREENING  06/22/2024 (Originally 10/27/1968)     INFLUENZA VACCINE (Season Ended) 09/01/2023     NICOTINE/TOBACCO CESSATION COUNSELING Q 1 YR  06/22/2024     ANNUAL REVIEW OF HM ORDERS  06/22/2024     FALL RISK ASSESSMENT  06/22/2024     MAMMO SCREENING  03/31/2025     ADVANCE CARE PLANNING  06/22/2028     COLORECTAL CANCER SCREENING  01/29/2029     PHQ-2 (once per calendar year)  Completed     Pneumococcal Vaccine: 65+ Years  Completed     ZOSTER IMMUNIZATION  Completed     IPV IMMUNIZATION  Aged Out     MENINGITIS IMMUNIZATION  Aged Out       1) Takes trazodone rarely for insomnia.  2) Hypothyroidism - Going on  "since age 24 after birth of daughter.  She has tried different replacements and now taking NP Thyroid 90 mg alternating with 105 mg every other day.  Monitored by Dr. Litzy Hull at [facility].  3) HTN - She is taking losartan 100 mg and amlodipine 2.5 mg daily.  She got petechiae from hydrochlorothiazide.  Also did not tolerated lisinopril/ beta blockers. Her BPs are elevated in office setting and normal at home.  She is followed by cardiology.  4) She has some chronic dizziness.  She did have normal hearing exam and they felt she had vestibular migraine and recommended neurology referral.  No headaches.    5) She has AREDS and gets checked yearly.        Review of Systems      OBJECTIVE:   BP (!) 150/100 (BP Location: Left arm, Patient Position: Sitting, Cuff Size: Adult Regular)   Pulse 57   Temp 97.9  F (36.6  C) (Oral)   Resp 16   Ht 1.664 m (5' 5.5\")   Wt 64.9 kg (143 lb 1.6 oz)   SpO2 96%   BMI 23.45 kg/m   Estimated body mass index is 23.45 kg/m  as calculated from the following:    Height as of this encounter: 1.664 m (5' 5.5\").    Weight as of this encounter: 64.9 kg (143 lb 1.6 oz).  Physical Exam  GENERAL APPEARANCE: healthy, alert and no distress  EYES: Eyes grossly normal to inspection, PERRL and conjunctivae and sclerae normal  HENT: ear canals and TM's normal, nose and mouth without ulcers or lesions, oropharynx clear and oral mucous membranes moist  NECK: no adenopathy, no asymmetry, masses, or scars and thyroid normal to palpation  RESP: lungs clear to auscultation - no rales, rhonchi or wheezes  BREAST: normal without masses, tenderness or nipple discharge and no palpable axillary masses or adenopathy. Bilateral implants.  CV: regular rate and rhythm, normal S1 S2, no S3 or S4, no murmur, click or rub, no peripheral edema and peripheral pulses strong  ABDOMEN: soft, nontender, no hepatosplenomegaly, no masses and bowel sounds normal  MS: no musculoskeletal defects are " noted and gait is age appropriate without ataxia  SKIN: no suspicious lesions or rashes  NEURO: Normal strength and tone, sensory exam grossly normal, mentation intact and speech normal  PSYCH: mentation appears normal and affect normal/bright    Diagnostic Test Results: mammogram/ colonoscopy    BP Readings from Last 6 Encounters:   06/22/23 (!) 150/100         ASSESSMENT / PLAN:     Problem List Items Addressed This Visit     Health care maintenance - Primary     Up to date with mammogram/ colonoscopy.  Declines tetanus today.  Will return for fasting labs.         Relevant Orders    REVIEW OF HEALTH MAINTENANCE PROTOCOL ORDERS (Completed)    Tubular adenoma of colon     Found on colonoscopy in 2019.  Needs repeat exam 2024.         Dizziness    Relevant Orders    Adult Neurology  Referral    Essential hypertension    Relevant Medications    losartan (COZAAR) 100 MG tablet   Other Visit Diagnoses     Vitamin D deficiency        Relevant Orders    Vitamin D Deficiency    Encounter for screening for other metabolic disorders        Relevant Orders    Comprehensive metabolic panel (BMP + Alb, Alk Phos, ALT, AST, Total. Bili, TP)    CBC with platelets    Lipid panel reflex to direct LDL Fasting        Recheck BP at home and call if persistently elevated.      COUNSELING:  Reviewed preventive health counseling, as reflected in patient instructions       Regular exercise       Healthy diet/nutrition       Vision screening       Dental care        She reports that she has been smoking cigarettes. She has never used smokeless tobacco.  Nicotine/Tobacco Cessation Plan:   Information offered: Patient not interested at this time      Appropriate preventive services were discussed with this patient, including applicable screening as appropriate for cardiovascular disease, diabetes, osteopenia/osteoporosis, and glaucoma.  As appropriate for age/gender, discussed screening for colorectal cancer, prostate cancer, breast  cancer, and cervical cancer. Checklist reviewing preventive services available has been given to the patient.    Reviewed patients plan of care and provided an AVS. The Basic Care Plan (routine screening as documented in Health Maintenance) for Mihaela meets the Care Plan requirement. This Care Plan has been established and reviewed with the Patient.      Estrellita Parker MD  [facility]    Identified Health Risks:    I have reviewed Opioid Use Disorder and Substance Use Disorder risk factors and made any needed referrals.

## 2023-06-26 NOTE — TELEPHONE ENCOUNTER
RECORDS RECEIVED FROM: Internal   REASON FOR VISIT: Dizziness   Date of Appt: 06/29/2023   NOTES (FOR ALL VISITS) STATUS DETAILS   OFFICE NOTE from referring provider Internal 06/22/2023 Dr [facility]    OFFICE NOTE from other specialist N/A    DISCHARGE SUMMARY from hospital N/A    DISCHARGE REPORT from the ER N/A    OPERATIVE REPORT N/A    FIONA Virus Labs (MS ONLY) N/A    EMG N/A    EEG N/A    MEDICATION LIST N/A    IMAGING  (FOR ALL VISITS)     LUMBAR PUNCTURE N/A    ELOISA SCAN (MOVEMENT) N/A    ULTRASOUND (CAROTID BILAT) *VASCULAR* N/A    MRI (HEAD, NECK, SPINE) N/A    CT (HEAD, NECK, SPINE) N/A

## 2023-06-27 NOTE — PROGRESS NOTES
[facility]/[place]  Section of General Neurology  New Patient Visit      Mihaela Pro MRN# [id number]   Age: 72 year old YOB: 1950              Assessment and Plan:   Assessment:  Mihaela Pro is a pleasant 72 year old female who presents in consultation for dizziness.  It has been present off and on for ~ 8 years.  Commonly in the morning after her cofffee/medication she will feel dizzy, sometimes veer to the left. No clear room spinning but just feels off.   No pre syncopal type of feelings with these.  No phono/photophobia.  She does have a remote history of migraine headaches but none x many decades she notes.  We discussed options in this regard.  Is a tad non specific type of dizziness, more vertiginous than pre syncopal.  Would start with MRI brain as below to exclude secondary cause, some supplements were discussed that I have had success with in similar situations that could be worth trialing.  Anti vert as needed would be a good idea to trial as well, can even trial a half table to start, side effects discussed.         Plan:  Magnesium oxide 400 mg Riboflavin (vitamin b2) 400 mg daily can improve dizziness, would trial daily  Meclizine (anti vert) up to 3x a day as needed, can try 1/2 tablet at first--D.C.eye 22 allergy warning flagged/to, to discuss with pharmacist, unclear what overlap there is in this regard  MRI brain w/w/o to exclude secondary causes  Vestibular PT--an option to consider pending response to above.  Not clearly BPPV or something in this vein would necessitate vestibular PT but certainly an option.   Could consider synthroid instead of NP thyroid again, discussed, as it seems like after she takes her AM pills is a precipitating factor though sounds like she didn't do well on synthroid previously.    If no good neurological reason could consider ENT for further evaluation given her hearing loss too to see if any reason that they could detect.   Will  "follow up in ~3 months.  I will reach out with MRI data in the interim and discuss if we should change our plan in the meanwhile          Fernando Null MD   of Neurology   [facility]      History of Presenting Symptoms:   Mihaela Pro is a 72 year old female who presents today for evaluation of dizziness.  She has a h/o insomnia, hypothyroidism on NP thyroid, fibromyalgia per previous documentation    Dizziness specific questions:  8 years ago noticed she veered to the left, no room spinning.    May be weak on the left side at times.   Wondered if it related to glasses  Supplements: changing these all the time.  Now on eye supplements, coq10, mushrooms.    Feels loopy at times, happens every day in the morning, after coffee, supplements.    Lasts for variable lengths of time.    No headaches in 40 years.    Can party at times and doesn't get headaches even in these situations in fact.     Lightheadedness/presyncopal feeling?--none related to his, previous 1 syncopal episodes  Duration of episodes? (less than 1 minute can be suggestive of BPPV)--variable  Triggers: (head movements e.g.) no clear provokation  History of migraine headaches? Long time ago  Photophobia, nausea or visual changes with the dizziness? None.    Tinnitus--none, but does have poor hearing, has hearing aids.      No specific vestibular PT hx, had done balance PT previously.      Retired, formerly stayed at home, worked at  previously  Lives in [place]     Referring note reviewed (Dr. Parker)  \"1) Takes trazodone rarely for insomnia.  2) Hypothyroidism - Going on since age 24 after birth of daughter.  She has tried different replacements and now taking NP Thyroid 90 mg alternating with 105 mg every other day.  Monitored by Dr. Ltizy Hlul at [facility].  3) HTN - She is taking losartan 100 mg and amlodipine 2.5 mg daily.  She got petechiae from hydrochlorothiazide.  Also " "did not tolerated lisinopril/ beta blockers. Her BPs are elevated in office setting and normal at home.  She is followed by cardiology.  4) She has some chronic dizziness.  She did have normal hearing exam and they felt she had vestibular migraine and recommended neurology referral.  No headaches.    5) She has AREDS and gets checked yearly\".    Past Medical History:     Patient Active Problem List   Diagnosis     Health care maintenance     Tubular adenoma of colon     Dizziness     Aftercare following surgery     Allergic rhinitis     Ascending aorta dilation (H)     Degenerative joint disease of left acromioclavicular joint     Fibromyalgia     Essential hypertension     Hallux limitus, right     Hypothyroidism     Spondylosis of lumbar region without myelopathy or radiculopathy     Osteopenia     Primary osteoarthritis of right shoulder     Pulmonary nodule     Sensorineural hearing loss of both ears     Tendinopathy of left biceps tendon     UARS (upper airway resistance syndrome)     No past medical history on file.     Past Surgical History:     Past Surgical History:   Procedure Laterality Date     MAMMOPLASTY AUGMENTATION Bilateral 2010    replaced in 2010..had them before that        Social History:     Social History     Tobacco Use     Smoking status: Some Days     Types: Cigarettes     Smokeless tobacco: Never   Vaping Use     Vaping Use: Never used        Family History:     Family History   Problem Relation Age of Onset     No Known Problems Mother      No Known Problems Father      No Known Problems Sister      No Known Problems Daughter      No Known Problems Maternal Grandmother      No Known Problems Maternal Grandfather      No Known Problems Paternal Grandmother      No Known Problems Paternal Grandfather      No Known Problems Maternal Aunt      No Known Problems Paternal Aunt      Hereditary Breast and Ovarian Cancer Syndrome No family hx of      Breast Cancer No family hx of      Cancer No " "family hx of      Colon Cancer No family hx of      Endometrial Cancer No family hx of      Ovarian Cancer No family hx of         Medications:     Current Outpatient Medications   Medication Sig     amLODIPine (NORVASC) 2.5 MG tablet      clobetasol (TEMOVATE) 0.05 % external ointment APPLY TO THE AFFECTED AREA OF LEG DAILY AS NEEDED.     Coenzyme Q10 100 MG/ML LIQD      losartan (COZAAR) 100 MG tablet      NP THYROID 15 MG tablet TAKE 1 TABLET BY MOUTH DAILY ALONG WITH 90 MG NP THYROID     traZODone (DESYREL) 50 MG tablet Take 50 mg by mouth as needed for sleep     No current facility-administered medications for this visit.        Allergies:   Not on File     Review of Systems:   As noted above     Physical Exam:   Vitals: BP (!) 170/116   Pulse 73   Ht 1.664 m (5' 5.5\")   Wt 64.9 kg (143 lb)   BMI 23.43 kg/m         Neuro:   General Appearance: No apparent distress, well-nourished, well-groomed, pleasant     Mental Status: Alert and oriented to person, place, and time. Speech fluent and comprehension intact. No dysarthria.       Cranial Nerves:   II: Visual fields: normal  III: Pupils: 3 mm, equal, round, reactive to light   III,IV,VI: Extraocular Movements: intact   V: Facial sensation: intact to light touch  VII: Facial strength: intact without asymmetry  VIII: Hearing: intact grossly  IX: Palate: intact   XII: Tongue movement: normal    Head impulse test negative     Motor Exam:   5/5 diffusely     No drift is present. No abnormal movements. Tone is normal throughout.    Sensory: intact to light touch, vibration    Coordination: no dysmetria with finger-to-nose bilaterally    Reflexes: biceps, triceps, brachioradialis, patellar, and ankle jerks 2+ and symmetric.                  The total time of this encounter today amounted to 60 minutes. This time included time spent with the patient, prep work, ordering tests, and performing post visit documentation.    "

## 2023-06-28 ENCOUNTER — LAB (OUTPATIENT)
Dept: LAB | Facility: CLINIC | Age: 73
End: 2023-06-28
Payer: MEDICARE

## 2023-06-28 DIAGNOSIS — Z13.228 ENCOUNTER FOR SCREENING FOR OTHER METABOLIC DISORDERS: ICD-10-CM

## 2023-06-28 DIAGNOSIS — E03.9 HYPOTHYROIDISM: Primary | ICD-10-CM

## 2023-06-28 DIAGNOSIS — E55.9 VITAMIN D DEFICIENCY: ICD-10-CM

## 2023-06-28 LAB
ALBUMIN SERPL BCG-MCNC: 4.4 G/DL (ref 3.5–5.2)
ALP SERPL-CCNC: 75 U/L (ref 35–104)
ALT SERPL W P-5'-P-CCNC: 25 U/L (ref 0–50)
ANION GAP SERPL CALCULATED.3IONS-SCNC: 10 MMOL/L (ref 7–15)
AST SERPL W P-5'-P-CCNC: 25 U/L (ref 0–45)
BILIRUB SERPL-MCNC: 0.9 MG/DL
BUN SERPL-MCNC: 11.1 MG/DL (ref 8–23)
CALCIUM SERPL-MCNC: 9.4 MG/DL (ref 8.8–10.2)
CHLORIDE SERPL-SCNC: 106 MMOL/L (ref 98–107)
CHOLEST SERPL-MCNC: 198 MG/DL
CREAT SERPL-MCNC: 0.73 MG/DL (ref 0.51–0.95)
DEPRECATED HCO3 PLAS-SCNC: 24 MMOL/L (ref 22–29)
ERYTHROCYTE [DISTWIDTH] IN BLOOD BY AUTOMATED COUNT: 12.5 % (ref 10–15)
GFR SERPL CREATININE-BSD FRML MDRD: 86 ML/MIN/1.73M2
GLUCOSE SERPL-MCNC: 110 MG/DL (ref 70–99)
HCT VFR BLD AUTO: 43.8 % (ref 35–47)
HDLC SERPL-MCNC: 89 MG/DL
HGB BLD-MCNC: 15.1 G/DL (ref 11.7–15.7)
LDLC SERPL CALC-MCNC: 95 MG/DL
MCH RBC QN AUTO: 32.5 PG (ref 26.5–33)
MCHC RBC AUTO-ENTMCNC: 34.5 G/DL (ref 31.5–36.5)
MCV RBC AUTO: 94 FL (ref 78–100)
NONHDLC SERPL-MCNC: 109 MG/DL
PLATELET # BLD AUTO: 227 10E3/UL (ref 150–450)
POTASSIUM SERPL-SCNC: 4.5 MMOL/L (ref 3.4–5.3)
PROT SERPL-MCNC: 7.2 G/DL (ref 6.4–8.3)
RBC # BLD AUTO: 4.65 10E6/UL (ref 3.8–5.2)
SODIUM SERPL-SCNC: 140 MMOL/L (ref 136–145)
TRIGL SERPL-MCNC: 72 MG/DL
TSH SERPL DL<=0.005 MIU/L-ACNC: 1.46 UIU/ML (ref 0.3–4.2)
WBC # BLD AUTO: 5.3 10E3/UL (ref 4–11)

## 2023-06-28 PROCEDURE — 84443 ASSAY THYROID STIM HORMONE: CPT

## 2023-06-28 PROCEDURE — 85027 COMPLETE CBC AUTOMATED: CPT

## 2023-06-28 PROCEDURE — 82306 VITAMIN D 25 HYDROXY: CPT

## 2023-06-28 PROCEDURE — 80061 LIPID PANEL: CPT

## 2023-06-28 PROCEDURE — 36415 COLL VENOUS BLD VENIPUNCTURE: CPT

## 2023-06-28 PROCEDURE — 80053 COMPREHEN METABOLIC PANEL: CPT

## 2023-06-29 ENCOUNTER — OFFICE VISIT (OUTPATIENT)
Dept: NEUROLOGY | Facility: CLINIC | Age: 73
End: 2023-06-29
Attending: FAMILY MEDICINE
Payer: MEDICARE

## 2023-06-29 ENCOUNTER — PRE VISIT (OUTPATIENT)
Dept: NEUROLOGY | Facility: CLINIC | Age: 73
End: 2023-06-29

## 2023-06-29 VITALS
HEART RATE: 73 BPM | SYSTOLIC BLOOD PRESSURE: 170 MMHG | BODY MASS INDEX: 22.98 KG/M2 | HEIGHT: 66 IN | DIASTOLIC BLOOD PRESSURE: 116 MMHG | WEIGHT: 143 LBS

## 2023-06-29 DIAGNOSIS — R42 DIZZINESS: ICD-10-CM

## 2023-06-29 LAB — DEPRECATED CALCIDIOL+CALCIFEROL SERPL-MC: 72 UG/L (ref 20–75)

## 2023-06-29 PROCEDURE — 99205 OFFICE O/P NEW HI 60 MIN: CPT | Performed by: STUDENT IN AN ORGANIZED HEALTH CARE EDUCATION/TRAINING PROGRAM

## 2023-06-29 RX ORDER — MECLIZINE HYDROCHLORIDE 25 MG/1
25 TABLET ORAL 3 TIMES DAILY PRN
Qty: 30 TABLET | Refills: 4 | Status: SHIPPED | OUTPATIENT
Start: 2023-06-29 | End: 2023-11-22

## 2023-06-29 NOTE — LETTER
6/29/2023         RE: Mihaela Pro  [address]  [place] [address]        Dear Colleague,    Thank you for referring your patient, Mihaela Pro, to the [facility] [place]. Please see a copy of my visit note below.    [facility]/[place]  Section of General Neurology  New Patient Visit      Mihaela Pro MRN# [id number]   Age: 72 year old YOB: 1950              Assessment and Plan:   Assessment:  Mihaela Pro is a pleasant 72 year old female who presents in consultation for dizziness.  It has been present off and on for ~ 8 years.  Commonly in the morning after her cofffee/medication she will feel dizzy, sometimes veer to the left. No clear room spinning but just feels off.   No pre syncopal type of feelings with these.  No phono/photophobia.  She does have a remote history of migraine headaches but none x many decades she notes.  We discussed options in this regard.  Is a tad non specific type of dizziness, more vertiginous than pre syncopal.  Would start with MRI brain as below to exclude secondary cause, some supplements were discussed that I have had success with in similar situations that could be worth trialing.  Anti vert as needed would be a good idea to trial as well, can even trial a half table to start, side effects discussed.         Plan:  Magnesium oxide 400 mg Riboflavin (vitamin b2) 400 mg daily can improve dizziness, would trial daily  Meclizine (anti vert) up to 3x a day as needed, can try 1/2 tablet at first--D.C.eye 22 allergy warning flagged/to, to discuss with pharmacist, unclear what overlap there is in this regard  MRI brain w/w/o to exclude secondary causes  Vestibular PT--an option to consider pending response to above.  Not clearly BPPV or something in this vein would necessitate vestibular PT but certainly an option.   Could consider synthroid instead of NP thyroid again, discussed, as it seems like after she takes  "her AM pills is a precipitating factor though sounds like she didn't do well on synthroid previously.    If no good neurological reason could consider ENT for further evaluation given her hearing loss too to see if any reason that they could detect.   Will follow up in ~3 months.  I will reach out with MRI data in the interim and discuss if we should change our plan in the meanwhile          Fernando Null MD   of Neurology   [facility]      History of Presenting Symptoms:   Mihaela Pro is a 72 year old female who presents today for evaluation of dizziness.  She has a h/o insomnia, hypothyroidism on NP thyroid, fibromyalgia per previous documentation    Dizziness specific questions:  8 years ago noticed she veered to the left, no room spinning.    May be weak on the left side at times.   Wondered if it related to glasses  Supplements: changing these all the time.  Now on eye supplements, coq10, mushrooms.    Feels loopy at times, happens every day in the morning, after coffee, supplements.    Lasts for variable lengths of time.    No headaches in 40 years.    Can party at times and doesn't get headaches even in these situations in fact.     Lightheadedness/presyncopal feeling?--none related to his, previous 1 syncopal episodes  Duration of episodes? (less than 1 minute can be suggestive of BPPV)--variable  Triggers: (head movements e.g.) no clear provokation  History of migraine headaches? Long time ago  Photophobia, nausea or visual changes with the dizziness? None.    Tinnitus--none, but does have poor hearing, has hearing aids.      No specific vestibular PT hx, had done balance PT previously.      Retired, formerly stayed at home, worked at [employer] previously  Lives in [place]     Referring note reviewed (Dr. Parker)  \"1) Takes trazodone rarely for insomnia.  2) Hypothyroidism - Going on since age 24 after birth of daughter.  She has tried different replacements " "and now taking NP Thyroid 90 mg alternating with 105 mg every other day.  Monitored by Dr[facility] Litzy Hull at [facility].  3) HTN - She is taking losartan 100 mg and amlodipine 2.5 mg daily.  She got petechiae from hydrochlorothiazide.  Also did not tolerated lisinopril/ beta blockers. Her BPs are elevated in office setting and normal at home.  She is followed by cardiology.  4) She has some chronic dizziness.  She did have normal hearing exam and they felt she had vestibular migraine and recommended neurology referral.  No headaches.    5) She has AREDS and gets checked yearly\".    Past Medical History:     Patient Active Problem List   Diagnosis     Health care maintenance     Tubular adenoma of colon     Dizziness     Aftercare following surgery     Allergic rhinitis     Ascending aorta dilation (H)     Degenerative joint disease of left acromioclavicular joint     Fibromyalgia     Essential hypertension     Hallux limitus, right     Hypothyroidism     Spondylosis of lumbar region without myelopathy or radiculopathy     Osteopenia     Primary osteoarthritis of right shoulder     Pulmonary nodule     Sensorineural hearing loss of both ears     Tendinopathy of left biceps tendon     UARS (upper airway resistance syndrome)     No past medical history on file.     Past Surgical History:     Past Surgical History:   Procedure Laterality Date     MAMMOPLASTY AUGMENTATION Bilateral 2010    replaced in 2010..had them before that        Social History:     Social History     Tobacco Use     Smoking status: Some Days     Types: Cigarettes     Smokeless tobacco: Never   Vaping Use     Vaping Use: Never used        Family History:     Family History   Problem Relation Age of Onset     No Known Problems Mother      No Known Problems Father      No Known Problems Sister      No Known Problems Daughter      No Known Problems Maternal Grandmother      No Known Problems Maternal Grandfather      No Known Problems " "Paternal Grandmother      No Known Problems Paternal Grandfather      No Known Problems Maternal Aunt      No Known Problems Paternal Aunt      Hereditary Breast and Ovarian Cancer Syndrome No family hx of      Breast Cancer No family hx of      Cancer No family hx of      Colon Cancer No family hx of      Endometrial Cancer No family hx of      Ovarian Cancer No family hx of         Medications:     Current Outpatient Medications   Medication Sig     amLODIPine (NORVASC) 2.5 MG tablet      clobetasol (TEMOVATE) 0.05 % external ointment APPLY TO THE AFFECTED AREA OF LEG DAILY AS NEEDED.     Coenzyme Q10 100 MG/ML LIQD      losartan (COZAAR) 100 MG tablet      NP THYROID 15 MG tablet TAKE 1 TABLET BY MOUTH DAILY ALONG WITH 90 MG NP THYROID     traZODone (DESYREL) 50 MG tablet Take 50 mg by mouth as needed for sleep     No current facility-administered medications for this visit.        Allergies:   Not on File     Review of Systems:   As noted above     Physical Exam:   Vitals: BP (!) 170/116   Pulse 73   Ht 1.664 m (5' 5.5\")   Wt 64.9 kg (143 lb)   BMI 23.43 kg/m         Neuro:   General Appearance: No apparent distress, well-nourished, well-groomed, pleasant     Mental Status: Alert and oriented to person, place, and time. Speech fluent and comprehension intact. No dysarthria.       Cranial Nerves:   II: Visual fields: normal  III: Pupils: 3 mm, equal, round, reactive to light   III,IV,VI: Extraocular Movements: intact   V: Facial sensation: intact to light touch  VII: Facial strength: intact without asymmetry  VIII: Hearing: intact grossly  IX: Palate: intact   XII: Tongue movement: normal    Head impulse test negative     Motor Exam:   5/5 diffusely     No drift is present. No abnormal movements. Tone is normal throughout.    Sensory: intact to light touch, vibration    Coordination: no dysmetria with finger-to-nose bilaterally    Reflexes: biceps, triceps, brachioradialis, patellar, and ankle jerks 2+ and " symmetric.                  The total time of this encounter today amounted to 60 minutes. This time included time spent with the patient, prep work, ordering tests, and performing post visit documentation.      Again, thank you for allowing me to participate in the care of your patient.        Sincerely,        Kavon Null MD

## 2023-06-29 NOTE — PATIENT INSTRUCTIONS
Magnesium oxide 400 mg Riboflavin (vitamin b2) 400 mg daily  Meclizine (anti vert) up to 3x a day, can try 1/2 tablet at first--discussed D.C.eye 22 allergy warning/to, discuss with pharmacist, unclear what overlap there is  MRI brain w/w/o  Vestibular PT--an option   Could consider synthroid instead of NP thyroid again, though sounds like you didn't do well on synthroid.    If no good neurological reason could consider ENT given your hearing loss too.

## 2023-06-29 NOTE — NURSING NOTE
"Mihaela Pro's goals for this visit include:   Chief Complaint   Patient presents with     New Patient     Dizziness-recs in epic-sched per pt- ref'd by Estrellita Parker.        She requests these members of her care team be copied on today's visit information: yes    PCP: No Ref-Primary, Physician    Referring Provider:  Estrellita Parker MD  [address] [place] [address]    BP (!) 170/116   Pulse 73   Ht 1.664 m (5' 5.5\")   Wt 64.9 kg (143 lb)   BMI 23.43 kg/m      Do you need any medication refills at today's visit? No  ANANT Arana., CMA ([facility])      "

## 2023-06-30 ENCOUNTER — TRANSFERRED RECORDS (OUTPATIENT)
Dept: HEALTH INFORMATION MANAGEMENT | Facility: CLINIC | Age: 73
End: 2023-06-30
Payer: MEDICARE

## 2023-07-12 ENCOUNTER — ANCILLARY PROCEDURE (OUTPATIENT)
Dept: MRI IMAGING | Facility: CLINIC | Age: 73
End: 2023-07-12
Attending: STUDENT IN AN ORGANIZED HEALTH CARE EDUCATION/TRAINING PROGRAM
Payer: MEDICARE

## 2023-07-12 DIAGNOSIS — R42 DIZZINESS: ICD-10-CM

## 2023-07-12 PROCEDURE — 70553 MRI BRAIN STEM W/O & W/DYE: CPT | Mod: TC | Performed by: RADIOLOGY

## 2023-07-12 PROCEDURE — A9585 GADOBUTROL INJECTION: HCPCS | Mod: JW | Performed by: RADIOLOGY

## 2023-07-12 PROCEDURE — G1010 CDSM STANSON: HCPCS | Performed by: RADIOLOGY

## 2023-07-12 RX ORDER — GADOBUTROL 604.72 MG/ML
6.5 INJECTION INTRAVENOUS ONCE
Status: COMPLETED | OUTPATIENT
Start: 2023-07-12 | End: 2023-07-12

## 2023-07-12 RX ADMIN — GADOBUTROL 6.5 ML: 604.72 INJECTION INTRAVENOUS at 12:46

## 2023-07-18 ENCOUNTER — TELEPHONE (OUTPATIENT)
Dept: FAMILY MEDICINE | Facility: CLINIC | Age: 73
End: 2023-07-18
Payer: MEDICARE

## 2023-07-18 NOTE — TELEPHONE ENCOUNTER
----- Message from Estrellita Parker MD sent at 7/18/2023  8:00 AM CDT -----  Please have pt review my comments regarding her MRI or can read them to her over phone is she is not able to get into her My Chart.  Needs BP recheck.

## 2023-07-26 NOTE — TELEPHONE ENCOUNTER
Left message to call back for: Mihaela  Information to relay to patient: please relay message below from Dr Parker     Noted that patient did see results on 7/18/23 at 9:33 am

## 2023-08-31 ENCOUNTER — TELEPHONE (OUTPATIENT)
Dept: FAMILY MEDICINE | Facility: CLINIC | Age: 73
End: 2023-08-31
Payer: MEDICARE

## 2023-08-31 NOTE — TELEPHONE ENCOUNTER
Patient Quality Outreach    Patient is due for the following:   Hypertension -  BP check    Next Steps:   Schedule a nurse only visit for Blood pressure check    Type of outreach:    Sent [employer] message.      Questions for provider review:    None           Ariel Logan  Chart routed to Care Team.

## 2023-09-27 NOTE — TELEPHONE ENCOUNTER
Patient Quality Outreach    Patient is due for the following:   Hypertension -  BP check    Next Steps:   No appointment made. Pt will schedule BP check with new provider. xl    Type of outreach:    Phone, spoke to patient/parent. She will follow up her blood pressure with new provider as Dr. Parker is no longer here per pt. xl      Questions for provider review:    None           Ariel Logan MA

## 2023-10-04 ENCOUNTER — OFFICE VISIT (OUTPATIENT)
Dept: NEUROLOGY | Facility: CLINIC | Age: 73
End: 2023-10-04
Payer: MEDICARE

## 2023-10-04 VITALS
HEIGHT: 66 IN | DIASTOLIC BLOOD PRESSURE: 120 MMHG | BODY MASS INDEX: 22.98 KG/M2 | HEART RATE: 76 BPM | WEIGHT: 143 LBS | SYSTOLIC BLOOD PRESSURE: 184 MMHG

## 2023-10-04 DIAGNOSIS — R42 DIZZINESS: Primary | ICD-10-CM

## 2023-10-04 PROCEDURE — 99214 OFFICE O/P EST MOD 30 MIN: CPT | Performed by: STUDENT IN AN ORGANIZED HEALTH CARE EDUCATION/TRAINING PROGRAM

## 2023-10-04 RX ORDER — THYROID 90 MG/1
90 TABLET ORAL DAILY
COMMUNITY
Start: 2023-07-28 | End: 2024-03-01

## 2023-10-04 RX ORDER — MECLIZINE HCL 12.5 MG 12.5 MG/1
12.5 TABLET ORAL 3 TIMES DAILY PRN
Qty: 90 TABLET | Refills: 5 | Status: SHIPPED | OUTPATIENT
Start: 2023-10-04 | End: 2024-09-09

## 2023-10-04 NOTE — PROGRESS NOTES
[facility]/[place]  Section of General Neurology  Return Patient Visit    Mihaela Pro MRN# [id number]   Age: 72 year old YOB: 1950              Assessment and Plan:   Assessment:  Mihaela Pro is a pleasant 72 year old female who presents in follow up for episodic dizziness.  As noted previously it is long standing and non specific.  Not clearly migrainous and nutritional options therein were not helpful.  Meclizine may have helped but made her sleepy.  She seems to have tried vestibular PT before to no avail.  MRI unrevealing,good news Discussed future options.  Plan as below     Plan:  --Decrease meclizine to 12.5 mg TIDPRN  --Future option: Re trial of vestibular PT, [facility], off label options such as benzodiazepines, selective serotonin reuptake inhibitors can improve dizziness in some patients  --She will reach out with issues questions or changes       Fernando Null MD   of Neurology   [facility]      Interval history:     Has done vestibular PT in this past  Discussed MRI results  Meclizine--was too strong for her, may have been helpful but hard to say  Doesn't wake up dizzy.   Can walk and golf, many steps  Mag/riboflavin conferred unclear benefit.  Didn't notice supplements helped.        A/P at previous visit  Mihaela Pro is a pleasant 72 year old female who presents in consultation for dizziness.  It has been present off and on for ~ 8 years.  Commonly in the morning after her cofffee/medication she will feel dizzy, sometimes veer to the left. No clear room spinning but just feels off.   No pre syncopal type of feelings with these.  No phono/photophobia.  She does have a remote history of migraine headaches but none x many decades she notes.  We discussed options in this regard.  Is a tad non specific type of dizziness, more vertiginous than pre syncopal.  Would start with MRI brain as below to  exclude secondary cause, some supplements were discussed that I have had success with in similar situations that could be worth trialing.  Anti vert as needed would be a good idea to trial as well, can even trial a half table to start, side effects discussed.         Plan:  Magnesium oxide 400 mg Riboflavin (vitamin b2) 400 mg daily can improve dizziness, would trial daily  Meclizine (anti vert) up to 3x a day as needed, can try 1/2 tablet at first--D.C.eye 22 allergy warning flagged/to, to discuss with pharmacist, unclear what overlap there is in this regard  MRI brain w/w/o to exclude secondary causes  Vestibular PT--an option to consider pending response to above.  Not clearly BPPV or something in this vein would necessitate vestibular PT but certainly an option.   Could consider synthroid instead of NP thyroid again, discussed, as it seems like after she takes her AM pills is a precipitating factor though sounds like she didn't do well on synthroid previously.    If no good neurological reason could consider ENT for further evaluation given her hearing loss too to see if any reason that they could detect.   Will follow up in ~3 months.  I will reach out with MRI data in the interim and discuss if we should change our plan in the meanwhile        Past Medical History:     Patient Active Problem List   Diagnosis    Health care maintenance    Tubular adenoma of colon    Dizziness    Aftercare following surgery    Allergic rhinitis    Ascending aorta dilation (H24)    Degenerative joint disease of left acromioclavicular joint    Fibromyalgia    Essential hypertension    Hallux limitus, right    Hypothyroidism    Spondylosis of lumbar region without myelopathy or radiculopathy    Osteopenia    Primary osteoarthritis of right shoulder    Pulmonary nodule    Sensorineural hearing loss of both ears    Tendinopathy of left biceps tendon    UARS (upper airway resistance syndrome)     No past medical history on file.      "Past Surgical History:     Past Surgical History:   Procedure Laterality Date    MAMMOPLASTY AUGMENTATION Bilateral 2010    replaced in 2010..had them before that        Social History:     Social History     Tobacco Use    Smoking status: Some Days     Types: Cigarettes    Smokeless tobacco: Never   Vaping Use    Vaping Use: Never used        Family History:     Family History   Problem Relation Age of Onset    No Known Problems Mother     No Known Problems Father     No Known Problems Sister     No Known Problems Daughter     No Known Problems Maternal Grandmother     No Known Problems Maternal Grandfather     No Known Problems Paternal Grandmother     No Known Problems Paternal Grandfather     No Known Problems Maternal Aunt     No Known Problems Paternal Aunt     Hereditary Breast and Ovarian Cancer Syndrome No family hx of     Breast Cancer No family hx of     Cancer No family hx of     Colon Cancer No family hx of     Endometrial Cancer No family hx of     Ovarian Cancer No family hx of         Medications:     Current Outpatient Medications   Medication Sig    amLODIPine (NORVASC) 2.5 MG tablet     clobetasol (TEMOVATE) 0.05 % external ointment APPLY TO THE AFFECTED AREA OF LEG DAILY AS NEEDED.    Coenzyme Q10 100 MG/ML LIQD     losartan (COZAAR) 100 MG tablet     meclizine (ANTIVERT) 25 MG tablet Take 1 tablet (25 mg) by mouth 3 times daily as needed for dizziness    NP THYROID 15 MG tablet TAKE 1 TABLET BY MOUTH DAILY ALONG WITH 90 MG NP THYROID    traZODone (DESYREL) 50 MG tablet Take 50 mg by mouth as needed for sleep     No current facility-administered medications for this visit.        Allergies:     Allergies   Allergen Reactions    Codeine Unknown     Nausea  Nausea  Nausea      D&C Red #22 Unknown     Other reaction(s): *Unknown  Other reaction(s): *Unknown      Diatrizoate Other (See Comments)     Patient states \"had a serious allergic reaction\"  Patient states \"had a serious allergic " "reaction\"  Other reaction(s): Other - Describe In Comment Field  Patient states \"had a serious allergic reaction\"      Diltiazem Unknown     rash  rash  rash      Furosemide Unknown     petecchiae  petecchiae  petecchiae      Hydrochlorothiazide W-Triamterene      petecchiae  petecchiae      Lisinopril Cough     Cough  Cough  Other reaction(s): Cough  Cough      Other Drug Allergy (See Comments)      Other reaction(s): Other - Describe In Comment Field  Allergic reaction to IV dye for eye exam. Patient had severe sneezing, congestion, and swelling around eyes.    Latex Rash     Adhesive tape with breast surgery  Adhesive tape with breast surgery  Adhesive tape with breast surgery            Physical Exam:   Vitals: BP (!) 184/120   Pulse 76   Ht 1.664 m (5' 5.5\")   Wt 64.9 kg (143 lb)   BMI 23.43 kg/m       Neuro:   General Appearance: No apparent distress, well-nourished, well-groomed, pleasant     Mental Status: Alert and oriented to person, place, and time. Speech fluent and comprehension intact. No dysarthria.     Cranial Nerves:   II: Visual fields: normal  III: Pupils: 3 mm, equal, round, reactive to light   III,IV,VI: Extraocular Movements: intact, no nystagmus  V: Facial sensation: intact to light touch  VII: Facial strength: intact without asymmetry     Motor Exam:   5/5 diffusely     Sensory: intact to light touch    Reflexes: biceps, triceps, brachioradialis, patellar, and ankle jerks 2+ and symmetric.        Data: Pertinent prior to visit     Narrative & Impression   MRI OF THE BRAIN WITHOUT AND WITH CONTRAST July 12, 2023 12:48 PM      HISTORY: Exclude secondary dizziness. Dizziness      TECHNIQUE: Axial diffusion-weighted with ADC map, axial T2-weighted  with fat saturation, axial T1-weighted, axial turboFLAIR and coronal  T1-weighted images of the brain were acquired without intravenous  contrast.  Following intravenous administration of gadolinium (6.5 mL  Gadavist), axial T1-weighted images of " the brain were acquired.      COMPARISON: None.     FINDINGS: There is moderate diffuse cerebral volume loss. There are  mild patchy periventricular areas of abnormal T2 signal hyperintensity  in the cerebral white matter bilaterally that are consistent with  sequela of chronic small vessel ischemic disease.      The ventricles and basal cisterns are within normal limits in  configuration given the degree of cerebral volume loss. There is no  midline shift. There are no extra-axial fluid collections. There is no  evidence for stroke or acute intracranial hemorrhage. There is no  abnormal contrast enhancement in the brain or its coverings.      There is no sinusitis or mastoiditis.                                                                      IMPRESSION: Diffuse cerebral volume loss and cerebral white matter  changes consistent with chronic small vessel ischemic disease. No  evidence for acute intracranial pathology.                   The total time of this encounter today amounted to 33 minutes. This time included time spent with the patient, prep work, ordering tests, and performing post visit documentation.

## 2023-10-04 NOTE — LETTER
10/4/2023         RE: Mihaela Pro  [address]  [place] [address]        Dear Colleague,    Thank you for referring your patient, Mihaela Pro, to the [facility] [place]. Please see a copy of my visit note below.    [facility]  Section of General Neurology  Return Patient Visit    Mihaela Pro MRN# [id number]   Age: 72 year old YOB: 1950              Assessment and Plan:   Assessment:  Mihaela Pro is a pleasant 72 year old female who presents in follow up for episodic dizziness.  As noted previously it is long standing and non specific.  Not clearly migrainous and nutritional options therein were not helpful.  Meclizine may have helped but made her sleepy.  She seems to have tried vestibular PT before to no avail.  MRI unrevealing,good news Discussed future options.  Plan as below     Plan:  --Decrease meclizine to 12.5 mg TIDPRN  --Future option: Re trial of vestibular PT, [facility], off label options such as benzodiazepines, selective serotonin reuptake inhibitors can improve dizziness in some patients  --She will reach out with issues questions or changes       Fernando Null MD   of Neurology   [facility]      Interval history:     Has done vestibular PT in this past  Discussed MRI results  Meclizine--was too strong for her, may have been helpful but hard to say  Doesn't wake up dizzy.   Can walk and golf, many steps  Mag/riboflavin conferred unclear benefit.  Didn't notice supplements helped.        A/P at previous visit  Mihaela Pro is a pleasant 72 year old female who presents in consultation for dizziness.  It has been present off and on for ~ 8 years.  Commonly in the morning after her cofffee/medication she will feel dizzy, sometimes veer to the left. No clear room spinning but just feels off.   No pre syncopal type of feelings with these.  No  phono/photophobia.  She does have a remote history of migraine headaches but none x many decades she notes.  We discussed options in this regard.  Is a tad non specific type of dizziness, more vertiginous than pre syncopal.  Would start with MRI brain as below to exclude secondary cause, some supplements were discussed that I have had success with in similar situations that could be worth trialing.  Anti vert as needed would be a good idea to trial as well, can even trial a half table to start, side effects discussed.         Plan:  Magnesium oxide 400 mg Riboflavin (vitamin b2) 400 mg daily can improve dizziness, would trial daily  Meclizine (anti vert) up to 3x a day as needed, can try 1/2 tablet at first--D.C.eye 22 allergy warning flagged/to, to discuss with pharmacist, unclear what overlap there is in this regard  MRI brain w/w/o to exclude secondary causes  Vestibular PT--an option to consider pending response to above.  Not clearly BPPV or something in this vein would necessitate vestibular PT but certainly an option.   Could consider synthroid instead of NP thyroid again, discussed, as it seems like after she takes her AM pills is a precipitating factor though sounds like she didn't do well on synthroid previously.    If no good neurological reason could consider ENT for further evaluation given her hearing loss too to see if any reason that they could detect.   Will follow up in ~3 months.  I will reach out with MRI data in the interim and discuss if we should change our plan in the meanwhile        Past Medical History:     Patient Active Problem List   Diagnosis     Health care maintenance     Tubular adenoma of colon     Dizziness     Aftercare following surgery     Allergic rhinitis     Ascending aorta dilation (H24)     Degenerative joint disease of left acromioclavicular joint     Fibromyalgia     Essential hypertension     Hallux limitus, right     Hypothyroidism     Spondylosis of lumbar region  without myelopathy or radiculopathy     Osteopenia     Primary osteoarthritis of right shoulder     Pulmonary nodule     Sensorineural hearing loss of both ears     Tendinopathy of left biceps tendon     UARS (upper airway resistance syndrome)     No past medical history on file.     Past Surgical History:     Past Surgical History:   Procedure Laterality Date     MAMMOPLASTY AUGMENTATION Bilateral 2010    replaced in 2010..had them before that        Social History:     Social History     Tobacco Use     Smoking status: Some Days     Types: Cigarettes     Smokeless tobacco: Never   Vaping Use     Vaping Use: Never used        Family History:     Family History   Problem Relation Age of Onset     No Known Problems Mother      No Known Problems Father      No Known Problems Sister      No Known Problems Daughter      No Known Problems Maternal Grandmother      No Known Problems Maternal Grandfather      No Known Problems Paternal Grandmother      No Known Problems Paternal Grandfather      No Known Problems Maternal Aunt      No Known Problems Paternal Aunt      Hereditary Breast and Ovarian Cancer Syndrome No family hx of      Breast Cancer No family hx of      Cancer No family hx of      Colon Cancer No family hx of      Endometrial Cancer No family hx of      Ovarian Cancer No family hx of         Medications:     Current Outpatient Medications   Medication Sig     amLODIPine (NORVASC) 2.5 MG tablet      clobetasol (TEMOVATE) 0.05 % external ointment APPLY TO THE AFFECTED AREA OF LEG DAILY AS NEEDED.     Coenzyme Q10 100 MG/ML LIQD      losartan (COZAAR) 100 MG tablet      meclizine (ANTIVERT) 25 MG tablet Take 1 tablet (25 mg) by mouth 3 times daily as needed for dizziness     NP THYROID 15 MG tablet TAKE 1 TABLET BY MOUTH DAILY ALONG WITH 90 MG NP THYROID     traZODone (DESYREL) 50 MG tablet Take 50 mg by mouth as needed for sleep     No current facility-administered medications for this visit.        Allergies:  "    Allergies   Allergen Reactions     Codeine Unknown     Nausea  Nausea  Nausea       D&C Red #22 Unknown     Other reaction(s): *Unknown  Other reaction(s): *Unknown       Diatrizoate Other (See Comments)     Patient states \"had a serious allergic reaction\"  Patient states \"had a serious allergic reaction\"  Other reaction(s): Other - Describe In Comment Field  Patient states \"had a serious allergic reaction\"       Diltiazem Unknown     rash  rash  rash       Furosemide Unknown     petecchiae  petecchiae  petecchiae       Hydrochlorothiazide W-Triamterene      petecchiae  petecchiae       Lisinopril Cough     Cough  Cough  Other reaction(s): Cough  Cough       Other Drug Allergy (See Comments)      Other reaction(s): Other - Describe In Comment Field  Allergic reaction to IV dye for eye exam. Patient had severe sneezing, congestion, and swelling around eyes.     Latex Rash     Adhesive tape with breast surgery  Adhesive tape with breast surgery  Adhesive tape with breast surgery            Physical Exam:   Vitals: BP (!) 184/120   Pulse 76   Ht 1.664 m (5' 5.5\")   Wt 64.9 kg (143 lb)   BMI 23.43 kg/m       Neuro:   General Appearance: No apparent distress, well-nourished, well-groomed, pleasant     Mental Status: Alert and oriented to person, place, and time. Speech fluent and comprehension intact. No dysarthria.     Cranial Nerves:   II: Visual fields: normal  III: Pupils: 3 mm, equal, round, reactive to light   III,IV,VI: Extraocular Movements: intact, no nystagmus  V: Facial sensation: intact to light touch  VII: Facial strength: intact without asymmetry     Motor Exam:   5/5 diffusely     Sensory: intact to light touch    Reflexes: biceps, triceps, brachioradialis, patellar, and ankle jerks 2+ and symmetric.        Data: Pertinent prior to visit     Narrative & Impression   MRI OF THE BRAIN WITHOUT AND WITH CONTRAST July 12, 2023 12:48 PM      HISTORY: Exclude secondary dizziness. Dizziness      TECHNIQUE: " Axial diffusion-weighted with ADC map, axial T2-weighted  with fat saturation, axial T1-weighted, axial turboFLAIR and coronal  T1-weighted images of the brain were acquired without intravenous  contrast.  Following intravenous administration of gadolinium (6.5 mL  Gadavist), axial T1-weighted images of the brain were acquired.      COMPARISON: None.     FINDINGS: There is moderate diffuse cerebral volume loss. There are  mild patchy periventricular areas of abnormal T2 signal hyperintensity  in the cerebral white matter bilaterally that are consistent with  sequela of chronic small vessel ischemic disease.      The ventricles and basal cisterns are within normal limits in  configuration given the degree of cerebral volume loss. There is no  midline shift. There are no extra-axial fluid collections. There is no  evidence for stroke or acute intracranial hemorrhage. There is no  abnormal contrast enhancement in the brain or its coverings.      There is no sinusitis or mastoiditis.                                                                      IMPRESSION: Diffuse cerebral volume loss and cerebral white matter  changes consistent with chronic small vessel ischemic disease. No  evidence for acute intracranial pathology.                   The total time of this encounter today amounted to 33 minutes. This time included time spent with the patient, prep work, ordering tests, and performing post visit documentation.      Again, thank you for allowing me to participate in the care of your patient.        Sincerely,        Kavon Null MD

## 2023-10-04 NOTE — PATIENT INSTRUCTIONS
Future considerations:  Re trying PT  [facility] another option  Benzodiazepines off label/SSRI

## 2023-10-04 NOTE — NURSING NOTE
"Mihaela HESS Morteza's goals for this visit include: ,rfv    She requests these members of her care team be copied on today's visit information: yes    PCP: No Ref-Primary, Physician    Referring Provider:  No referring provider defined for this encounter.    BP (!) 184/120   Pulse 76   Ht 1.664 m (5' 5.5\")   Wt 64.9 kg (143 lb)   BMI 23.43 kg/m      Do you need any medication refills at today's visit? Yes    DONNIE Arana, CMA ([facility])      "

## 2023-11-22 ENCOUNTER — OFFICE VISIT (OUTPATIENT)
Dept: FAMILY MEDICINE | Facility: CLINIC | Age: 73
End: 2023-11-22
Payer: MEDICARE

## 2023-11-22 VITALS
WEIGHT: 142.1 LBS | HEIGHT: 66 IN | RESPIRATION RATE: 16 BRPM | DIASTOLIC BLOOD PRESSURE: 103 MMHG | BODY MASS INDEX: 22.84 KG/M2 | SYSTOLIC BLOOD PRESSURE: 153 MMHG | HEART RATE: 63 BPM | OXYGEN SATURATION: 98 % | TEMPERATURE: 98 F

## 2023-11-22 DIAGNOSIS — R91.1 PULMONARY NODULE: ICD-10-CM

## 2023-11-22 DIAGNOSIS — F17.200 TOBACCO USE DISORDER: ICD-10-CM

## 2023-11-22 DIAGNOSIS — I10 UNCONTROLLED HYPERTENSION: Primary | ICD-10-CM

## 2023-11-22 DIAGNOSIS — Z23 NEED FOR TDAP VACCINATION: ICD-10-CM

## 2023-11-22 DIAGNOSIS — R42 DIZZINESS: ICD-10-CM

## 2023-11-22 DIAGNOSIS — I77.810 ASCENDING AORTA DILATION (H): ICD-10-CM

## 2023-11-22 DIAGNOSIS — Z29.11 NEED FOR VACCINATION AGAINST RESPIRATORY SYNCYTIAL VIRUS: ICD-10-CM

## 2023-11-22 PROBLEM — M47.816 SPONDYLOSIS OF LUMBAR REGION WITHOUT MYELOPATHY OR RADICULOPATHY: Status: RESOLVED | Noted: 2017-05-30 | Resolved: 2023-11-22

## 2023-11-22 PROBLEM — M19.012 DEGENERATIVE JOINT DISEASE OF LEFT ACROMIOCLAVICULAR JOINT: Status: RESOLVED | Noted: 2019-03-15 | Resolved: 2023-11-22

## 2023-11-22 PROBLEM — Z48.89 AFTERCARE FOLLOWING SURGERY: Status: RESOLVED | Noted: 2017-06-26 | Resolved: 2023-11-22

## 2023-11-22 PROBLEM — M19.011 PRIMARY OSTEOARTHRITIS OF RIGHT SHOULDER: Status: RESOLVED | Noted: 2019-04-01 | Resolved: 2023-11-22

## 2023-11-22 PROBLEM — M20.5X1 HALLUX LIMITUS, RIGHT: Status: RESOLVED | Noted: 2021-01-24 | Resolved: 2023-11-22

## 2023-11-22 PROBLEM — M67.922 TENDINOPATHY OF LEFT BICEPS TENDON: Status: RESOLVED | Noted: 2019-04-01 | Resolved: 2023-11-22

## 2023-11-22 PROCEDURE — 99214 OFFICE O/P EST MOD 30 MIN: CPT | Performed by: FAMILY MEDICINE

## 2023-11-22 RX ORDER — RESPIRATORY SYNCYTIAL VIRUS VACCINE 120MCG/0.5
0.5 KIT INTRAMUSCULAR ONCE
Qty: 1 EACH | Refills: 0 | Status: SHIPPED | OUTPATIENT
Start: 2023-11-22 | End: 2023-11-22

## 2023-11-22 RX ORDER — AMLODIPINE BESYLATE 5 MG/1
5 TABLET ORAL DAILY
Qty: 30 TABLET | Refills: 1 | Status: SHIPPED | OUTPATIENT
Start: 2023-11-22

## 2023-11-22 ASSESSMENT — ENCOUNTER SYMPTOMS
PALPITATIONS: 0
DIZZINESS: 0
CHEST TIGHTNESS: 0
SHORTNESS OF BREATH: 0

## 2023-11-22 NOTE — ASSESSMENT & PLAN NOTE
Ascending aorta dilitation in the setting of uncontrolled htn, and smoking. Recommended regular visits at least monthly until bp controlled. And smoking cessation motivational interviewing reviewed. Plan follow up with cardiology 2/2024.

## 2023-11-22 NOTE — ASSESSMENT & PLAN NOTE
Chronic dizziness, managed by Dr. Kavon Null - neurology. He said her brain scans look normal. So uses meclizine for dizziness. She has physical therapy which did help. She thinks she should go back for more physical therapy.

## 2023-11-22 NOTE — ASSESSMENT & PLAN NOTE
Uncontrolled htn   Currently on best 2.5 mg p.o. daily, and Cozaar 100 mg p.o. daily  Recommend increase norvasc to 5mg dose and follow up in 1 month.   HTN  Patient is asked to monitor BP at home or work, several times per month and return with written values at next office visit. Goal bp is 120/80. If staying higher than 130/85 on three occasions you should bring the values into clinic so that we can evaluate and treat if needed.    Recommend stop alcohol, caffiene, ibuprofen, carbonated drinks, sudafed & decrease salt intake. If you smoke, it is recommended that you quit. Keep weight in normal range.   Bmp and bp recheck in 1 month.

## 2023-11-22 NOTE — PROGRESS NOTES
I spent a total of 40 minutes with this patient, and review of the medical record and with documentation.  This time was spent on the day of service.     Assessment & Plan   Problem List Items Addressed This Visit          Respiratory    Pulmonary nodule     Pulmonary nodule stable, thought to be stable. She says no further follow up is needed.             Circulatory    Ascending aorta dilation (H24)     Ascending aorta dilitation in the setting of uncontrolled htn, and smoking. Recommended regular visits at least monthly until bp controlled. And smoking cessation motivational interviewing reviewed. Plan follow up with cardiology 2/2024.          Uncontrolled hypertension - Primary     Uncontrolled htn   Currently on best 2.5 mg p.o. daily, and Cozaar 100 mg p.o. daily  Recommend increase norvasc to 5mg dose and follow up in 1 month.   HTN  Patient is asked to monitor BP at home or work, several times per month and return with written values at next office visit. Goal bp is 120/80. If staying higher than 130/85 on three occasions you should bring the values into clinic so that we can evaluate and treat if needed.    Recommend stop alcohol, caffiene, ibuprofen, carbonated drinks, sudafed & decrease salt intake. If you smoke, it is recommended that you quit. Keep weight in normal range.   Bmp and bp recheck in 1 month.          Relevant Medications    amLODIPine (NORVASC) 5 MG tablet       Behavioral    Tobacco use disorder     Tobacco use disorder.   Once in 6 months.             Other    Dizziness     Chronic dizziness, managed by Dr. Kavon Null - neurology. He said her brain scans look normal. So uses meclizine for dizziness. She has physical therapy which did help. She thinks she should go back for more physical therapy.          Other Visit Diagnoses       Need for Tdap vaccination        Relevant Medications    Tdap, tetanus-diptheria-acell pertussis, (BOOSTRIX) 5-2.5-18.5 LF-MCG/0.5 MAE injection    Need  "for vaccination against respiratory syncytial virus        Relevant Medications    respiratory syncytial virus vaccine, bivalent (ABRYSVO) injection             Sravanthi Avery MD  [facility] JAE Chairez is a 73 year old, presenting for the following health issues:  Establish Care (BP check)        11/22/2023     1:04 PM   Additional Questions   Roomed by Torin Linder MA   Accompanied by Self         11/22/2023     1:04 PM   Patient Reported Additional Medications   Patient reports taking the following new medications None       History of Present Illness       Reason for visit:  New patient    She eats 2-3 servings of fruits and vegetables daily.She consumes 1 sweetened beverage(s) daily.She exercises with enough effort to increase her heart rate 20 to 29 minutes per day.  She exercises with enough effort to increase her heart rate 4 days per week.   She is taking medications regularly.       Review of Systems   Respiratory:  Negative for chest tightness and shortness of breath.    Cardiovascular:  Negative for chest pain and palpitations.   Neurological:  Negative for dizziness (baseline dizziness, eval by neuro in past, but nothing new today).            Objective    BP (!) 153/103 (BP Location: Left arm, Patient Position: Sitting, Cuff Size: Adult Regular)   Pulse 63   Temp 98  F (36.7  C) (Oral)   Resp 16   Ht 1.664 m (5' 5.5\")   Wt 64.5 kg (142 lb 1.6 oz)   SpO2 98%   BMI 23.29 kg/m    Body mass index is 23.29 kg/m .  Physical Exam  Constitutional:       Appearance: Normal appearance.   HENT:      Head: Normocephalic and atraumatic.   Cardiovascular:      Rate and Rhythm: Normal rate and regular rhythm.   Pulmonary:      Effort: Pulmonary effort is normal.   Musculoskeletal:         General: Normal range of motion.      Cervical back: Normal range of motion and neck supple.   Neurological:      General: No focal deficit present.      Mental Status: She is alert and " oriented to person, place, and time.

## 2023-11-22 NOTE — PATIENT INSTRUCTIONS
HTN  Patient is asked to monitor BP at home or work, several times per month and return with written values at next office visit. Goal bp is 120/80. If staying higher than 130/85 on three occasions you should bring the values into clinic so that we can evaluate and treat if needed.    Recommend stop alcohol, caffiene, ibuprofen, carbonated drinks, sudafed & decrease salt intake. If you smoke, it is recommended that you quit. Keep weight in normal range.     FOLLOW UP IN 1 MONTH WITH ME OR CARDIOLOGY TO ENSURE BP IS AT GOAL.

## 2024-01-08 ENCOUNTER — TELEPHONE (OUTPATIENT)
Dept: FAMILY MEDICINE | Facility: CLINIC | Age: 74
End: 2024-01-08
Payer: MEDICARE

## 2024-01-08 NOTE — TELEPHONE ENCOUNTER
Reason for Call:  Appointment Request    Patient requesting this type of appt: Chronic Diease Management/Medication/Follow-Up    Requested provider: Sravanthi Avery    Reason patient unable to be scheduled: Needs to be scheduled by clinic    When does patient want to be seen/preferred time:  1 month follow up    Comments: Patient was seen on 11/22/23 and was asked to come back in 1 month for a recheck. Next opening isn't until March, can she be fitted into the scheduled?    Could we send this information to you in [employer] or would you prefer to receive a phone call?:   Patient would prefer a phone call   Okay to leave a detailed message?: Yes at Cell number on file:    Telephone Information:   Mobile [phone]     Miroslava Howe   [facility]  Central Scheduler  Call taken on 1/8/2024 at 10:38 AM by MIROSLAVA HOWE

## 2024-01-23 ENCOUNTER — OFFICE VISIT (OUTPATIENT)
Dept: FAMILY MEDICINE | Facility: CLINIC | Age: 74
End: 2024-01-23
Payer: MEDICARE

## 2024-01-23 VITALS
RESPIRATION RATE: 16 BRPM | SYSTOLIC BLOOD PRESSURE: 136 MMHG | BODY MASS INDEX: 23.46 KG/M2 | TEMPERATURE: 98 F | DIASTOLIC BLOOD PRESSURE: 98 MMHG | WEIGHT: 146 LBS | HEIGHT: 66 IN | HEART RATE: 67 BPM | OXYGEN SATURATION: 98 %

## 2024-01-23 DIAGNOSIS — M85.80 OSTEOPENIA, UNSPECIFIED LOCATION: ICD-10-CM

## 2024-01-23 DIAGNOSIS — Z78.0 ASYMPTOMATIC MENOPAUSAL STATE: ICD-10-CM

## 2024-01-23 DIAGNOSIS — Z23 NEED FOR TDAP VACCINATION: ICD-10-CM

## 2024-01-23 DIAGNOSIS — F17.200 TOBACCO USE DISORDER: ICD-10-CM

## 2024-01-23 DIAGNOSIS — I10 UNCONTROLLED HYPERTENSION: ICD-10-CM

## 2024-01-23 DIAGNOSIS — I77.810 ASCENDING AORTA DILATION (H): Primary | ICD-10-CM

## 2024-01-23 DIAGNOSIS — Z29.11 NEED FOR VACCINATION AGAINST RESPIRATORY SYNCYTIAL VIRUS: ICD-10-CM

## 2024-01-23 PROCEDURE — 99214 OFFICE O/P EST MOD 30 MIN: CPT | Performed by: FAMILY MEDICINE

## 2024-01-23 RX ORDER — RESPIRATORY SYNCYTIAL VIRUS VACCINE 120MCG/0.5
0.5 KIT INTRAMUSCULAR ONCE
Qty: 1 EACH | Refills: 0 | Status: SHIPPED | OUTPATIENT
Start: 2024-01-23 | End: 2024-01-23

## 2024-01-23 NOTE — ASSESSMENT & PLAN NOTE
Ascending aorta dilitation in the setting of uncontrolled htn, and smoking. Recommended regular visits at least monthly until bp controlled. And smoking cessation motivational interviewing reviewed. Plan follow up with cardiology 2/2024.      Todays bp 136/98  Currently taking norvasc 2.5 mg po q day (she says she didn't increase to the 5mg dose as advised at her last visit because she had not really been taking any norvasc, so she decided just to restart taking the 2.5mg)  So now she is on norvasc 2.5mg po q day and losartan 100mg po q day.   Recommend increase norvasc from 2.5mg po q day to 5 mg po q day and follow up in 1 month.

## 2024-01-23 NOTE — ASSESSMENT & PLAN NOTE
Uncontrolled but improving htn.   Ascending aorta dilitation in the setting of uncontrolled htn, and smoking. Recommended regular visits at least monthly until bp controlled. And smoking cessation motivational interviewing reviewed. Plan follow up with cardiology 2/2024.       Todays bp 136/98  Currently taking norvasc 2.5 mg po q day (she says she didn't increase to the 5mg dose as advised at her last visit because she had not really been taking any norvasc, so she decided just to restart taking the 2.5mg)  So now she is on norvasc 2.5mg po q day and losartan 100mg po q day.   Recommend increase norvasc from 2.5mg po q day to 5 mg po q day and follow up in 1 month.

## 2024-01-23 NOTE — PROGRESS NOTES
Assessment & Plan   Problem List Items Addressed This Visit          Circulatory    Ascending aorta dilation (H24) - Primary     Ascending aorta dilitation in the setting of uncontrolled htn, and smoking. Recommended regular visits at least monthly until bp controlled. And smoking cessation motivational interviewing reviewed. Plan follow up with cardiology 2/2024.      Todays bp 136/98  Currently taking norvasc 2.5 mg po q day (she says she didn't increase to the 5mg dose as advised at her last visit because she had not really been taking any norvasc, so she decided just to restart taking the 2.5mg)  So now she is on norvasc 2.5mg po q day and losartan 100mg po q day.   Recommend increase norvasc from 2.5mg po q day to 5 mg po q day and follow up in 1 month.          Uncontrolled hypertension     Uncontrolled but improving htn.   Ascending aorta dilitation in the setting of uncontrolled htn, and smoking. Recommended regular visits at least monthly until bp controlled. And smoking cessation motivational interviewing reviewed. Plan follow up with cardiology 2/2024.       Todays bp 136/98  Currently taking norvasc 2.5 mg po q day (she says she didn't increase to the 5mg dose as advised at her last visit because she had not really been taking any norvasc, so she decided just to restart taking the 2.5mg)  So now she is on norvasc 2.5mg po q day and losartan 100mg po q day.   Recommend increase norvasc from 2.5mg po q day to 5 mg po q day and follow up in 1 month.             Musculoskeletal and Integumentary    Osteopenia     Osteopenia, repeat dexa is ordered.          Relevant Orders    DEXA HIP/PELVIS/SPINE - Future       Behavioral    Tobacco use disorder     Tobacco use disorder, motivational interviewing performed.          Other Visit Diagnoses       Need for Tdap vaccination        Relevant Medications    Tdap, tetanus-diptheria-acell pertussis, (BOOSTRIX) 5-2.5-18.5 LF-MCG/0.5 MAE injection    Need for  "vaccination against respiratory syncytial virus        Relevant Medications    respiratory syncytial virus vaccine, bivalent (ABRYSVO) injection    Asymptomatic menopausal state        Relevant Orders    DEXA HIP/PELVIS/SPINE - Future               Subjective   Mihaela is a 73 year old, presenting for the following health issues:  Hypertension      1/23/2024    12:29 PM   Additional Questions   Roomed by as   Accompanied by self         1/23/2024    12:29 PM   Patient Reported Additional Medications   Patient reports taking the following new medications no     History of Present Illness       Hypertension: She presents for follow up of hypertension.  She does not check blood pressure  regularly outside of the clinic. Outside blood pressures have been over 140/90. She does not follow a low salt diet.     She eats 4 or more servings of fruits and vegetables daily.She consumes 0 sweetened beverage(s) daily.She exercises with enough effort to increase her heart rate 20 to 29 minutes per day.  She exercises with enough effort to increase her heart rate 3 or less days per week.   She is taking medications regularly.         Objective    BP (!) 136/98 (BP Location: Left arm, Patient Position: Left side, Cuff Size: Adult Regular)   Pulse 67   Temp 98  F (36.7  C) (Oral)   Resp 16   Ht 1.664 m (5' 5.5\")   Wt 66.2 kg (146 lb)   SpO2 98%   BMI 23.93 kg/m    Body mass index is 23.93 kg/m .  Physical Exam  Constitutional:       Appearance: Normal appearance.   HENT:      Head: Normocephalic and atraumatic.   Cardiovascular:      Rate and Rhythm: Normal rate and regular rhythm.   Pulmonary:      Effort: Pulmonary effort is normal.   Musculoskeletal:         General: Normal range of motion.      Cervical back: Normal range of motion and neck supple.   Neurological:      General: No focal deficit present.      Mental Status: She is alert and oriented to person, place, and time.              Signed Electronically by: Sravanthi " MD Gena

## 2024-01-29 ENCOUNTER — HOSPITAL ENCOUNTER (OUTPATIENT)
Dept: BONE DENSITY | Facility: HOSPITAL | Age: 74
Discharge: HOME | End: 2024-01-29
Attending: FAMILY MEDICINE | Admitting: FAMILY MEDICINE
Payer: MEDICARE

## 2024-01-29 DIAGNOSIS — Z78.0 ASYMPTOMATIC MENOPAUSAL STATE: ICD-10-CM

## 2024-01-29 DIAGNOSIS — M85.80 OSTEOPENIA, UNSPECIFIED LOCATION: ICD-10-CM

## 2024-01-29 PROCEDURE — 77080 DXA BONE DENSITY AXIAL: CPT

## 2024-01-30 ENCOUNTER — MYC MEDICAL ADVICE (OUTPATIENT)
Dept: GASTROENTEROLOGY | Facility: CLINIC | Age: 74
End: 2024-01-30
Payer: MEDICARE

## 2024-01-30 ENCOUNTER — PATIENT OUTREACH (OUTPATIENT)
Dept: GASTROENTEROLOGY | Facility: CLINIC | Age: 74
End: 2024-01-30
Payer: MEDICARE

## 2024-01-30 DIAGNOSIS — Z12.11 SPECIAL SCREENING FOR MALIGNANT NEOPLASMS, COLON: Primary | ICD-10-CM

## 2024-01-30 NOTE — TELEPHONE ENCOUNTER
"[facility] Screening Colonoscopy Referral Review    Patient meets the inclusion criteria for screening colonoscopy standing order.    Ordering/Referring Provider:  Sravanthi Avery     BMI: Estimated body mass index is 23.93 kg/m  as calculated from the following:    Height as of 1/23/24: 1.664 m (5' 5.5\").    Weight as of 1/23/24: 66.2 kg (146 lb).     Sedation:  Does patient have any of the following conditions affecting sedation?  No medical conditions affecting sedation.    Previous Scopes:  Any previous recommendations or follow up needs based on previous scope?  na / No recommendations.    Medical Concerns to Postpone Order:  Does patient have any of the following medical concerns that should postpone/delay colonoscopy referral?  No medical conditions affecting colonoscopy referral.    Final Referral Details:  Based on patient's medical history patient is appropriate for referral order with moderate sedation. If patient's BMI > 50 do not schedule in ASC.    Virginia De Dios RN on 1/30/2024 at 1:04 PM    "

## 2024-03-01 ENCOUNTER — ANESTHESIA EVENT (OUTPATIENT)
Dept: SURGERY | Facility: AMBULATORY SURGERY CENTER | Age: 74
End: 2024-03-01
Payer: MEDICARE

## 2024-03-04 ENCOUNTER — HOSPITAL ENCOUNTER (OUTPATIENT)
Facility: AMBULATORY SURGERY CENTER | Age: 74
Discharge: HOME | End: 2024-03-04
Attending: COLON & RECTAL SURGERY
Payer: MEDICARE

## 2024-03-04 ENCOUNTER — ANESTHESIA (OUTPATIENT)
Dept: SURGERY | Facility: AMBULATORY SURGERY CENTER | Age: 74
End: 2024-03-04
Payer: MEDICARE

## 2024-03-04 VITALS
TEMPERATURE: 97.5 F | OXYGEN SATURATION: 97 % | HEART RATE: 63 BPM | SYSTOLIC BLOOD PRESSURE: 135 MMHG | DIASTOLIC BLOOD PRESSURE: 76 MMHG | RESPIRATION RATE: 16 BRPM

## 2024-03-04 DIAGNOSIS — Z12.11 COLON CANCER SCREENING: ICD-10-CM

## 2024-03-04 LAB — COLONOSCOPY: NORMAL

## 2024-03-04 RX ORDER — ONDANSETRON 2 MG/ML
INJECTION INTRAMUSCULAR; INTRAVENOUS PRN
Status: DISCONTINUED | OUTPATIENT
Start: 2024-03-04 | End: 2024-03-04

## 2024-03-04 RX ORDER — PROPOFOL 10 MG/ML
INJECTION, EMULSION INTRAVENOUS CONTINUOUS PRN
Status: DISCONTINUED | OUTPATIENT
Start: 2024-03-04 | End: 2024-03-04

## 2024-03-04 RX ORDER — PROPOFOL 10 MG/ML
INJECTION, EMULSION INTRAVENOUS PRN
Status: DISCONTINUED | OUTPATIENT
Start: 2024-03-04 | End: 2024-03-04

## 2024-03-04 RX ORDER — GLYCOPYRROLATE 0.2 MG/ML
INJECTION, SOLUTION INTRAMUSCULAR; INTRAVENOUS PRN
Status: DISCONTINUED | OUTPATIENT
Start: 2024-03-04 | End: 2024-03-04

## 2024-03-04 RX ORDER — SODIUM CHLORIDE, SODIUM LACTATE, POTASSIUM CHLORIDE, CALCIUM CHLORIDE 600; 310; 30; 20 MG/100ML; MG/100ML; MG/100ML; MG/100ML
INJECTION, SOLUTION INTRAVENOUS CONTINUOUS
Status: DISCONTINUED | OUTPATIENT
Start: 2024-03-04 | End: 2024-03-05 | Stop reason: HOSPADM

## 2024-03-04 RX ORDER — LIDOCAINE 40 MG/G
CREAM TOPICAL
Status: DISCONTINUED | OUTPATIENT
Start: 2024-03-04 | End: 2024-03-05 | Stop reason: HOSPADM

## 2024-03-04 RX ORDER — LIDOCAINE HYDROCHLORIDE 20 MG/ML
INJECTION, SOLUTION INFILTRATION; PERINEURAL PRN
Status: DISCONTINUED | OUTPATIENT
Start: 2024-03-04 | End: 2024-03-04

## 2024-03-04 RX ORDER — ONDANSETRON 4 MG/1
4 TABLET, ORALLY DISINTEGRATING ORAL
Status: CANCELLED | OUTPATIENT
Start: 2024-03-04

## 2024-03-04 RX ADMIN — LIDOCAINE HYDROCHLORIDE 50 MG: 20 INJECTION, SOLUTION INFILTRATION; PERINEURAL at 07:03

## 2024-03-04 RX ADMIN — PROPOFOL 200 MCG/KG/MIN: 10 INJECTION, EMULSION INTRAVENOUS at 07:03

## 2024-03-04 RX ADMIN — GLYCOPYRROLATE 0.2 MG: 0.2 INJECTION, SOLUTION INTRAMUSCULAR; INTRAVENOUS at 07:03

## 2024-03-04 RX ADMIN — PROPOFOL 30 MG: 10 INJECTION, EMULSION INTRAVENOUS at 07:03

## 2024-03-04 RX ADMIN — SODIUM CHLORIDE, SODIUM LACTATE, POTASSIUM CHLORIDE, CALCIUM CHLORIDE: 600; 310; 30; 20 INJECTION, SOLUTION INTRAVENOUS at 06:45

## 2024-03-04 RX ADMIN — ONDANSETRON 4 MG: 2 INJECTION INTRAMUSCULAR; INTRAVENOUS at 07:04

## 2024-03-04 ASSESSMENT — LIFESTYLE VARIABLES: TOBACCO_USE: 1

## 2024-03-04 NOTE — H&P
"Colon & Rectal Surgery Pre-procedure H&P    3/4/2024     Primary Care Physician     Chief Complaint/Reason for Procedure:             surveillance, history of polyps    History and Physical:   Mihaela Pro is a 73 year old female presenting for colonoscopy for surveillance purposes.       Past Medical History   I have reviewed this patient's medical history and updated it with pertinent information if needed.   Past Medical History:   Diagnosis Date    Aftercare following surgery     Anemia     Degenerative joint disease of left acromioclavicular joint     Hallux limitus, right     History of blood transfusion     Hypertension     PONV (postoperative nausea and vomiting)     Primary osteoarthritis of right shoulder     Spondylosis of lumbar region without myelopathy or radiculopathy     Tendinopathy of left biceps tendon     Thyroid disease        Past Surgical History   I have reviewed this patient's surgical history and updated it with pertinent information if needed.  Past Surgical History:   Procedure Laterality Date    MAMMOPLASTY AUGMENTATION Bilateral 2010    replaced in 2010..had them before that       Allergies:    Allergies   Allergen Reactions    Codeine Unknown     Nausea  Nausea  Nausea      D&C Red #22 Unknown     Other reaction(s): *Unknown  Other reaction(s): *Unknown      Diatrizoate Other (See Comments)     Patient states \"had a serious allergic reaction\"  Patient states \"had a serious allergic reaction\"  Other reaction(s): Other - Describe In Comment Field  Patient states \"had a serious allergic reaction\"      Diltiazem Unknown     rash  rash  rash      Furosemide Unknown     petecchiae  petecchiae  petecchiae      Hydrochlorothiazide W-Triamterene      petecchiae  petecchiae      Lisinopril Cough     Cough  Cough  Other reaction(s): Cough  Cough      Other Drug Allergy (See Comments)      Other reaction(s): Other - Describe In Comment Field  Allergic reaction to IV dye for eye exam. Patient " "had severe sneezing, congestion, and swelling around eyes.    Latex Rash     Adhesive tape with breast surgery  Adhesive tape with breast surgery  Adhesive tape with breast surgery           Prior to Admission Medications   Cannot display prior to admission medications because the patient has not been admitted in this contact.     Allergies   Allergies   Allergen Reactions    Codeine Unknown     Nausea  Nausea  Nausea      D&C Red #22 Unknown     Other reaction(s): *Unknown  Other reaction(s): *Unknown      Diatrizoate Other (See Comments)     Patient states \"had a serious allergic reaction\"  Patient states \"had a serious allergic reaction\"  Other reaction(s): Other - Describe In Comment Field  Patient states \"had a serious allergic reaction\"      Diltiazem Unknown     rash  rash  rash      Furosemide Unknown     petecchiae  petecchiae  petecchiae      Hydrochlorothiazide W-Triamterene      petecchiae  petecchiae      Lisinopril Cough     Cough  Cough  Other reaction(s): Cough  Cough      Other Drug Allergy (See Comments)      Other reaction(s): Other - Describe In Comment Field  Allergic reaction to IV dye for eye exam. Patient had severe sneezing, congestion, and swelling around eyes.    Latex Rash     Adhesive tape with breast surgery  Adhesive tape with breast surgery  Adhesive tape with breast surgery         Social History   I have reviewed this patient's social history and updated it with pertinent information if needed. Mihaela HESS Morteza  reports that she has been smoking cigarettes. She has never used smokeless tobacco. She reports current alcohol use. She reports that she does not use drugs.    Family History   I have reviewed this patient's family history and updated it with pertinent information if needed.   Family History   Problem Relation Age of Onset    No Known Problems Mother     No Known Problems Father     No Known Problems Sister     No Known Problems Daughter     No Known Problems Maternal " Grandmother     No Known Problems Maternal Grandfather     No Known Problems Paternal Grandmother     No Known Problems Paternal Grandfather     No Known Problems Maternal Aunt     No Known Problems Paternal Aunt     Hereditary Breast and Ovarian Cancer Syndrome No family hx of     Breast Cancer No family hx of     Cancer No family hx of     Colon Cancer No family hx of     Endometrial Cancer No family hx of     Ovarian Cancer No family hx of        Review of Systems   The 5 point Review of Systems is negative other than noted in the HPI or here.     Physical Exam   Temp: 97.2  F (36.2  C) Temp src: Temporal BP: (!) 147/97 Pulse: 77   Resp: 16 SpO2: 97 % O2 Device: None (Room air)    Vital Signs with Ranges  Temp:  [97.2  F (36.2  C)] 97.2  F (36.2  C)  Pulse:  [77] 77  Resp:  [16] 16  BP: (147)/(97) 147/97  SpO2:  [97 %] 97 %  0 lbs 0 oz    Aaox3,   Lungs clear b  Rrr    Assessment/Plan:  Mihaela HESS Morteza presents for colonoscopy. Risks and benefits of colonoscopy discussed in detail and informed consent obtained.      - proceed with colonoscopy    Cece Logan MD, MS  [facility]  Office: [phone]  3/4/2024 6:53 AM

## 2024-03-04 NOTE — DISCHARGE INSTRUCTIONS
Discharge Instructions after Colonoscopy      Activity and Diet  You were given medicine for sedation. You may be dizzy or sleepy.  For 24 hours:   Do not drive or use heavy equipment.   Do not make important decisions.   Do not drink any alcohol.  You may return to your normal diet and medicines.    Discomfort   Air was placed in your colon during the exam in order to see it. Walking helps to pass the air.   You may take Tylenol (acetaminophen) for pain unless your doctor has told you not to.      When to call:    Call right away if you have:   Severe abdominal or chest pain not relieved with passing air.   More than 1 to 2 Tablespoons of bleeding from your rectum.   Fever above 100.6  F (37.5  C).    If you have severe pain, bleeding, or shortness of breath, go to an emergency room.    If you have questions, call:  Monday to Friday, 7 a.m. to 4:30 p.m.  [phone]         If you have any questions or concerns regarding your procedure, please contact LEORA Gamino, her office number is [phone].

## 2024-03-04 NOTE — ANESTHESIA CARE TRANSFER NOTE
Patient: Mihaela Pro    Procedure: Procedure(s):  COLONOSCOPY WITH POLYPECTOMY AND BIOPSY       Diagnosis: Colon cancer screening [Z12.11]  Diagnosis Additional Information: No value filed.    Anesthesia Type:   MAC     Note:    Oropharynx: oropharynx clear of all foreign objects and spontaneously breathing  Level of Consciousness: awake  Oxygen Supplementation: room air    Independent Airway: airway patency satisfactory and stable  Dentition: dentition unchanged  Vital Signs Stable: post-procedure vital signs reviewed and stable  Report to RN Given: handoff report given  Patient transferred to: Phase II  Comments: Awake and alert / VSS / tolerating room air / denies pain  Handoff Report: Identifed the Patient, Identified the Reponsible Provider, Reviewed the pertinent medical history, Discussed the surgical course, Reviewed Intra-OP anesthesia mangement and issues during anesthesia, Set expectations for post-procedure period and Allowed opportunity for questions and acknowledgement of understanding  Vitals:  Vitals Value Taken Time   /77 03/04/24 0725   Temp 37  C (98.6  F) 03/04/24 0725   Pulse 87 03/04/24 0725   Resp 14 03/04/24 0725   SpO2 97 % 03/04/24 0725       Electronically Signed By: INA Berkowitz CRNA  March 4, 2024  7:26 AM

## 2024-03-04 NOTE — ANESTHESIA POSTPROCEDURE EVALUATION
Patient: Mihaela Pro    Procedure: Procedure(s):  COLONOSCOPY WITH POLYPECTOMY AND BIOPSY       Anesthesia Type:  MAC    Note:  Disposition: Outpatient   Postop Pain Control: Uneventful            Sign Out: Well controlled pain   PONV: No   Neuro/Psych: Uneventful            Sign Out: Acceptable/Baseline neuro status   Airway/Respiratory: Uneventful            Sign Out: Acceptable/Baseline resp. status   CV/Hemodynamics: Uneventful            Sign Out: Acceptable CV status; No obvious hypovolemia; No obvious fluid overload   Other NRE: NONE   DID A NON-ROUTINE EVENT OCCUR? No           Last vitals:  Vitals Value Taken Time   /76 03/04/24 0815   Temp 97.5  F (36.4  C) 03/04/24 0815   Pulse 67 03/04/24 0825   Resp 16 03/04/24 0815   SpO2 98 % 03/04/24 0825   Vitals shown include unfiled device data.    Electronically Signed By: Ana Villa MD  March 4, 2024  8:49 AM

## 2024-03-04 NOTE — ANESTHESIA PREPROCEDURE EVALUATION
"Anesthesia Pre-Procedure Evaluation    Patient: Mihaela Pro   MRN: [id number] : 1950        Procedure : Procedure(s):  COLONOSCOPY          Past Medical History:   Diagnosis Date    Aftercare following surgery     Anemia     Degenerative joint disease of left acromioclavicular joint     Hallux limitus, right     History of blood transfusion     Hypertension     PONV (postoperative nausea and vomiting)     Primary osteoarthritis of right shoulder     Spondylosis of lumbar region without myelopathy or radiculopathy     Tendinopathy of left biceps tendon     Thyroid disease       Past Surgical History:   Procedure Laterality Date    MAMMOPLASTY AUGMENTATION Bilateral 2010    replaced in ..had them before that      Allergies   Allergen Reactions    Codeine Unknown     Nausea  Nausea  Nausea      D&C Red #22 Unknown     Other reaction(s): *Unknown  Other reaction(s): *Unknown      Diatrizoate Other (See Comments)     Patient states \"had a serious allergic reaction\"  Patient states \"had a serious allergic reaction\"  Other reaction(s): Other - Describe In Comment Field  Patient states \"had a serious allergic reaction\"      Diltiazem Unknown     rash  rash  rash      Furosemide Unknown     petecchiae  petecchiae  petecchiae      Hydrochlorothiazide W-Triamterene      petecchiae  petecchiae      Lisinopril Cough     Cough  Cough  Other reaction(s): Cough  Cough      Other Drug Allergy (See Comments)      Other reaction(s): Other - Describe In Comment Field  Allergic reaction to IV dye for eye exam. Patient had severe sneezing, congestion, and swelling around eyes.    Latex Rash     Adhesive tape with breast surgery  Adhesive tape with breast surgery  Adhesive tape with breast surgery        Social History     Tobacco Use    Smoking status: Some Days     Types: Cigarettes    Smokeless tobacco: Never    Tobacco comments:     Smokes very seldom   Substance Use Topics    Alcohol use: Yes     Comment: couple " "glasses of wine or liquer      Wt Readings from Last 1 Encounters:   01/23/24 66.2 kg (146 lb)        Anesthesia Evaluation   Pt has had prior anesthetic. Type: MAC and General.    History of anesthetic complications  - PONV.      ROS/MED HX  ENT/Pulmonary:     (+)                tobacco use,                        Neurologic:    (-) no CVA and no TIA   Cardiovascular:     (+)  hypertension- -   -  - -                                      METS/Exercise Tolerance:     Hematologic:       Musculoskeletal:   (+)  arthritis (fibromyalgia),             GI/Hepatic:       Renal/Genitourinary:       Endo:     (+)          thyroid problem,            Psychiatric/Substance Use:       Infectious Disease:       Malignancy:       Other:      (+)  , H/O Chronic Pain,         Physical Exam    Airway        Mallampati: II   TM distance: > 3 FB   Neck ROM: full     Respiratory Devices and Support         Dental       (+) Completely normal teeth      Cardiovascular          Rhythm and rate: regular     Pulmonary           breath sounds clear to auscultation           OUTSIDE LABS:  CBC:   Lab Results   Component Value Date    WBC 5.3 06/28/2023    HGB 15.1 06/28/2023    HCT 43.8 06/28/2023     06/28/2023     BMP:   Lab Results   Component Value Date     06/28/2023    POTASSIUM 4.5 06/28/2023    CHLORIDE 106 06/28/2023    CO2 24 06/28/2023    BUN 11.1 06/28/2023    CR 0.73 06/28/2023     (H) 06/28/2023     COAGS: No results found for: \"PTT\", \"INR\", \"FIBR\"  POC: No results found for: \"BGM\", \"HCG\", \"HCGS\"  HEPATIC:   Lab Results   Component Value Date    ALBUMIN 4.4 06/28/2023    PROTTOTAL 7.2 06/28/2023    ALT 25 06/28/2023    AST 25 06/28/2023    ALKPHOS 75 06/28/2023    BILITOTAL 0.9 06/28/2023     OTHER:   Lab Results   Component Value Date    SEGUNDO 9.4 06/28/2023    TSH 1.46 06/28/2023       Anesthesia Plan    ASA Status:  2    NPO Status:  NPO Appropriate    Anesthesia Type: MAC.   Induction: Intravenous. "   Maintenance: Balanced.        Consents    Anesthesia Plan(s) and associated risks, benefits, and realistic alternatives discussed. Questions answered and patient/representative(s) expressed understanding.     - Discussed: Risks, Benefits and Alternatives for BOTH SEDATION and the PROCEDURE were discussed     - Discussed with:             Postoperative Care    Pain management: IV analgesics, Oral pain medications, Multi-modal analgesia.   PONV prophylaxis: Ondansetron (or other 5HT-3), Dexamethasone or Solumedrol     Comments:               Ana Villa MD    I have reviewed the pertinent notes and labs in the chart from the past 30 days and (re)examined the patient.  Any updates or changes from those notes are reflected in this note.

## 2024-05-08 ENCOUNTER — OFFICE VISIT (OUTPATIENT)
Dept: FAMILY MEDICINE | Facility: CLINIC | Age: 74
End: 2024-05-08
Payer: MEDICARE

## 2024-05-08 VITALS
BODY MASS INDEX: 24.04 KG/M2 | SYSTOLIC BLOOD PRESSURE: 134 MMHG | RESPIRATION RATE: 16 BRPM | HEIGHT: 66 IN | WEIGHT: 149.6 LBS | OXYGEN SATURATION: 96 % | TEMPERATURE: 98 F | HEART RATE: 75 BPM | DIASTOLIC BLOOD PRESSURE: 84 MMHG

## 2024-05-08 DIAGNOSIS — Z23 NEED FOR TDAP VACCINATION: ICD-10-CM

## 2024-05-08 DIAGNOSIS — I10 UNCONTROLLED HYPERTENSION: ICD-10-CM

## 2024-05-08 DIAGNOSIS — F17.200 TOBACCO USE DISORDER: Primary | ICD-10-CM

## 2024-05-08 DIAGNOSIS — Z01.818 PREOP GENERAL PHYSICAL EXAM: ICD-10-CM

## 2024-05-08 DIAGNOSIS — Z51.81 ENCOUNTER FOR THERAPEUTIC DRUG MONITORING: ICD-10-CM

## 2024-05-08 DIAGNOSIS — K63.89 COLONIC MASS: ICD-10-CM

## 2024-05-08 DIAGNOSIS — Z29.11 NEED FOR VACCINATION AGAINST RESPIRATORY SYNCYTIAL VIRUS: ICD-10-CM

## 2024-05-08 LAB
ERYTHROCYTE [DISTWIDTH] IN BLOOD BY AUTOMATED COUNT: 12.5 % (ref 10–15)
HCT VFR BLD AUTO: 40.7 % (ref 35–47)
HGB BLD-MCNC: 14 G/DL (ref 11.7–15.7)
MCH RBC QN AUTO: 32.5 PG (ref 26.5–33)
MCHC RBC AUTO-ENTMCNC: 34.4 G/DL (ref 31.5–36.5)
MCV RBC AUTO: 94 FL (ref 78–100)
PLATELET # BLD AUTO: 238 10E3/UL (ref 150–450)
RBC # BLD AUTO: 4.31 10E6/UL (ref 3.8–5.2)
WBC # BLD AUTO: 7 10E3/UL (ref 4–11)

## 2024-05-08 PROCEDURE — 80053 COMPREHEN METABOLIC PANEL: CPT | Performed by: FAMILY MEDICINE

## 2024-05-08 PROCEDURE — 99214 OFFICE O/P EST MOD 30 MIN: CPT | Performed by: FAMILY MEDICINE

## 2024-05-08 PROCEDURE — 85027 COMPLETE CBC AUTOMATED: CPT | Performed by: FAMILY MEDICINE

## 2024-05-08 PROCEDURE — 36415 COLL VENOUS BLD VENIPUNCTURE: CPT | Performed by: FAMILY MEDICINE

## 2024-05-08 RX ORDER — RESPIRATORY SYNCYTIAL VIRUS VACCINE 120MCG/0.5
0.5 KIT INTRAMUSCULAR ONCE
Qty: 1 EACH | Refills: 0 | Status: CANCELLED | OUTPATIENT
Start: 2024-05-08 | End: 2024-05-08

## 2024-05-08 ASSESSMENT — ENCOUNTER SYMPTOMS
DIARRHEA: 0
FEVER: 0
CHILLS: 0
DYSURIA: 0
EYE PAIN: 0
DIZZINESS: 0
PALPITATIONS: 0
WEAKNESS: 0
FREQUENCY: 0
PSYCHIATRIC NEGATIVE: 1
ABDOMINAL PAIN: 0
HEADACHES: 0
MYALGIAS: 0
SHORTNESS OF BREATH: 0
BRUISES/BLEEDS EASILY: 0
PARESTHESIAS: 0
CONSTIPATION: 0
COUGH: 0
SORE THROAT: 0
ARTHRALGIAS: 0

## 2024-05-08 NOTE — PATIENT INSTRUCTIONS
Preparing for Your Surgery  Getting started  A nurse will call you to review your health history and instructions. They will give you an arrival time based on your scheduled surgery time. Please be ready to share:  Your doctor's clinic name and phone number  Your medical, surgical, and anesthesia history  A list of allergies and sensitivities  A list of medicines, including herbal treatments and over-the-counter drugs  Whether the patient has a legal guardian (ask how to send us the papers in advance)  Please tell us if you're pregnant--or if there's any chance you might be pregnant. Some surgeries may injure a fetus (unborn baby), so they require a pregnancy test. Surgeries that are safe for a fetus don't always need a test, and you can choose whether to have one.   If you have a child who's having surgery, please ask for a copy of Preparing for Your Child's Surgery.    Preparing for surgery  Within 10 to 30 days of surgery: Have a pre-op exam (sometimes called an H&P, or History and Physical). This can be done at a clinic or pre-operative center.  If you're having a , you may not need this exam. Talk to your care team.  At your pre-op exam, talk to your care team about all medicines you take. If you need to stop any medicines before surgery, ask when to start taking them again.  We do this for your safety. Many medicines can make you bleed too much during surgery. Some change how well surgery (anesthesia) drugs work.  Call your insurance company to let them know you're having surgery. (If you don't have insurance, call [phone].)  Call your clinic if there's any change in your health. This includes signs of a cold or flu (sore throat, runny nose, cough, rash, fever). It also includes a scrape or scratch near the surgery site.  If you have questions on the day of surgery, call your hospital or surgery center.  Eating and drinking guidelines  For your safety: Unless your surgeon tells you otherwise,  follow the guidelines below.  Eat and drink as usual until 8 hours before you arrive for surgery. After that, no food or milk.  Drink clear liquids until 2 hours before you arrive. These are liquids you can see through, like water, Gatorade, and Propel Water. They also include plain black coffee and tea (no cream or milk), candy, and breath mints. You can spit out gum when you arrive.  If you drink alcohol: Stop drinking it the night before surgery.  If your care team tells you to take medicine on the morning of surgery, it's okay to take it with a sip of water.  Preventing infection  Shower or bathe the night before and morning of your surgery. Follow the instructions your clinic gave you. (If no instructions, use regular soap.)  Don't shave or clip hair near your surgery site. We'll remove the hair if needed.  Don't smoke or vape the morning of surgery. You may chew nicotine gum up to 2 hours before surgery. A nicotine patch is okay.  Note: Some surgeries require you to completely quit smoking and nicotine. Check with your surgeon.  Your care team will make every effort to keep you safe from infection. We will:  Clean our hands often with soap and water (or an alcohol-based hand rub).  Clean the skin at your surgery site with a special soap that kills germs.  Give you a special gown to keep you warm. (Cold raises the risk of infection.)  Wear special hair covers, masks, gowns and gloves during surgery.  Give antibiotic medicine, if prescribed. Not all surgeries need antibiotics.  What to bring on the day of surgery  Photo ID and insurance card  Copy of your health care directive, if you have one  Glasses and hearing aids (bring cases)  You can't wear contacts during surgery  Inhaler and eye drops, if you use them (tell us about these when you arrive)  CPAP machine or breathing device, if you use them  A few personal items, if spending the night  If you have . . .  A pacemaker, ICD (cardiac defibrillator) or other  implant: Bring the ID card.  An implanted stimulator: Bring the remote control.  A legal guardian: Bring a copy of the certified (court-stamped) guardianship papers.  Please remove any jewelry, including body piercings. Leave jewelry and other valuables at home.  If you're going home the day of surgery  You must have a responsible adult drive you home. They should stay with you overnight as well.  If you don't have someone to stay with you, and you aren't safe to go home alone, we may keep you overnight. Insurance often won't pay for this.  After surgery  If it's hard to control your pain or you need more pain medicine, please call your surgeon's office.  Questions?   If you have any questions for your care team, list them here: _________________________________________________________________________________________________________________________________________________________________________ ____________________________________ ____________________________________ ____________________________________  For informational purposes only. Not to replace the advice of your health care provider. Copyright   2003, 2019 [employer]. All rights reserved. Clinically reviewed by Shoshana Hidalgo MD. [employer] 361077 - REV 12/22.    How to Take Your Medication Before Surgery  - HOLD (do not take) thyroid medication in the morning of surgery and take after surgery once you are eating again.

## 2024-05-08 NOTE — ASSESSMENT & PLAN NOTE
Apparent benign colonic mass noted 3/2024 during colonoscopy, biopsy benign, but further eval will be done and possible resection to avoid obstruction in the future etc.     Surgical Information  Surgery/Procedure: Endoscopic Ultrasound lower tract  Surgery Location: [facility]  Surgeon: Dr. Pederson  Surgery Date: 5/24/24  Time of Surgery: Unknown

## 2024-05-08 NOTE — PROGRESS NOTES
Preoperative Evaluation  [place] [id number]  [facility] [address]  Phone: [phone]  Fax: [phone]  Primary Provider: Sravanthi Avery  Pre-op Performing Provider: SRAVANTHI AVERY  May 8, 2024       Mihaela is a 73 year old, presenting for the following:  Pre-Op Exam and Imm/Inj (Declined vaccines today )        5/8/2024     1:29 PM   Additional Questions   Roomed by Torin Linder MA   Accompanied by Self         5/8/2024     1:29 PM   Patient Reported Additional Medications   Patient reports taking the following new medications None     Surgical Information  Surgery/Procedure: Endoscopic Ultrasound lower tract  Surgery Location: [facility]  Surgeon: Dr. Pederson  Surgery Date: 5/24/24  Time of Surgery: Unknown  Where patient plans to recover: At home with family  Fax number [email or web address] surgical facility: Note does not need to be faxed, will be available electronically in Epic.    Assessment & Plan     The proposed surgical procedure is considered LOW risk.    Problem List Items Addressed This Visit          Digestive    Colonic mass     Apparent benign colonic mass noted 3/2024 during colonoscopy, biopsy benign, but further eval will be done and possible resection to avoid obstruction in the future etc.     Surgical Information  Surgery/Procedure: Endoscopic Ultrasound lower tract  Surgery Location: [facility]  Surgeon: Dr. Pederson  Surgery Date: 5/24/24  Time of Surgery: Unknown            Circulatory    Uncontrolled hypertension     Hypertension controlled. Cozaar 100mg po q day, norvasc 5 mg po q day.             Behavioral    RESOLVED: Tobacco use disorder - Primary     She is not smoking           Other Visit Diagnoses       Need for Tdap vaccination        Need for vaccination against respiratory syncytial virus        Preop general physical exam        Encounter for therapeutic drug monitoring        Relevant Orders    CBC with platelets     Comprehensive metabolic panel (BMP + Alb, Alk Phos, ALT, AST, Total. Bili, TP)                   Risks and Recommendations  The patient has the following additional risks and recommendations for perioperative complications:   - No identified additional risk factors other than previously addressed    Antiplatelet or Anticoagulation Medication Instructions   - Patient is on no antiplatelet or anticoagulation medications.    Additional Medication Instructions  The only medication the patient takes in the mornings is thyroid - so she will wait to take that until after the surgery.     Recommendation  APPROVAL GIVEN to proceed with proposed procedure, without further diagnostic evaluation.    Subjective       HPI related to upcoming procedure: mass noted on colonoscopy. Plan to further investigate and consider resection.         5/7/2024    11:50 AM   Preop Questions   1. Have you ever had a heart attack or stroke? No   2. Have you ever had surgery on your heart or blood vessels, such as a stent placement, a coronary artery bypass, or surgery on an artery in your head, neck, heart, or legs? No   3. Do you have chest pain with activity? No   4. Do you have a history of  heart failure? No   5. Do you currently have a cold, bronchitis or symptoms of other infection? No   6. Do you have a cough, shortness of breath, or wheezing? No   7. Do you or anyone in your family have previous history of blood clots? No   8. Do you or does anyone in your family have a serious bleeding problem such as prolonged bleeding following surgeries or cuts? No   9. Have you ever had problems with anemia or been told to take iron pills? No   10. Have you had any abnormal blood loss such as black, tarry or bloody stools, or abnormal vaginal bleeding? No   11. Have you ever had a blood transfusion? YES  - transfusion secondary to heavy menses decades ago    11a. Have you ever had a transfusion reaction? No   12. Are you willing to have a blood  "transfusion if it is medically needed before, during, or after your surgery? Yes   13. Have you or any of your relatives ever had problems with anesthesia? No   14. Do you have sleep apnea, excessive snoring or daytime drowsiness? No   15. Do you have any artifical heart valves or other implanted medical devices like a pacemaker, defibrillator, or continuous glucose monitor? No   16. Do you have artificial joints? No   17. Are you allergic to latex? YES       Health Care Directive  Patient does not have a Health Care Directive or Living Will: Patient states has Advance Directive and will bring in a copy to clinic.    Preoperative Review of    reviewed - no record of controlled substances prescribed.    Patient Active Problem List    Diagnosis Date Noted    Colonic mass 05/08/2024     Priority: Medium    Health care maintenance 06/22/2023     Priority: Medium    Tubular adenoma of colon 06/22/2023     Priority: Medium    Dizziness 06/22/2023     Priority: Medium    Fibromyalgia 06/22/2023     Priority: Medium    Osteopenia 06/22/2023     Priority: Medium     Formatting of this note might be different from the original.  mild, right hip      Sensorineural hearing loss of both ears 06/15/2022     Priority: Medium    Ascending aorta dilation (H24) 05/03/2018     Priority: Medium    UARS (upper airway resistance syndrome) 11/30/2016     Priority: Medium     Formatting of this note might be different from the original.  POLYSOMNOGRAM   PSG type: full night baseline  Date of study:10/26/2016; weight: 137#  Facility[facility]  Baseline data:  Total Sleep Time:  395.5\"  Sleep Efficiency: 88.3%  AHI: 1.5  RDI: 11.4  Rory O2%: 92  MD interp: Obstructive Sleep Apnea, overall mild and large component of upper airway resistance syndrome, exacerbated in supine position  see tele enctr 11/30/2016      Pulmonary nodule 11/01/2016     Priority: Medium     Formatting of this note might be different from the original.  6 mm " "left lower lobe      Allergic rhinitis 09/17/2007     Priority: Medium    Hypothyroidism 09/17/2007     Priority: Medium    Uncontrolled hypertension 07/30/2007     Priority: Medium      Past Medical History:   Diagnosis Date    Aftercare following surgery     Anemia     Degenerative joint disease of left acromioclavicular joint     Hallux limitus, right     History of blood transfusion     Hypertension     PONV (postoperative nausea and vomiting)     Primary osteoarthritis of right shoulder     Spondylosis of lumbar region without myelopathy or radiculopathy     Tendinopathy of left biceps tendon     Thyroid disease     Tobacco use disorder 11/22/2023     Past Surgical History:   Procedure Laterality Date    COLONOSCOPY N/A 03/04/2024    Procedure: COLONOSCOPY WITH POLYPECTOMY AND BIOPSY;  Surgeon: Cece Logan MD;  Location: [facility]    MAMMOPLASTY AUGMENTATION Bilateral 01/01/2010    replaced in 2010..had them before that    ORTHOPEDIC SURGERY      Shoulder surgery     Current Outpatient Medications   Medication Sig Dispense Refill    amLODIPine (NORVASC) 5 MG tablet Take 1 tablet (5 mg) by mouth daily 30 tablet 1    clobetasol (TEMOVATE) 0.05 % external ointment APPLY TO THE AFFECTED AREA OF LEG DAILY AS NEEDED.      Coenzyme Q10 100 MG/ML LIQD       losartan (COZAAR) 100 MG tablet       meclizine (ANTIVERT) 12.5 MG tablet Take 1 tablet (12.5 mg) by mouth 3 times daily as needed for dizziness 90 tablet 5    NP THYROID 15 MG tablet Take 15 mg by mouth every 48 hours      traZODone (DESYREL) 50 MG tablet Take 50 mg by mouth as needed for sleep         Allergies   Allergen Reactions    Codeine Unknown     Nausea  Nausea  Nausea      D&C Red #22 Unknown     Other reaction(s): *Unknown  Other reaction(s): *Unknown      Diatrizoate Other (See Comments)     Patient states \"had a serious allergic reaction\"  Patient states \"had a serious allergic reaction\"  Other reaction(s): Other - Describe In Comment " "Field  Patient states \"had a serious allergic reaction\"      Diltiazem Unknown     rash  rash  rash      Furosemide Unknown     petecchiae  petecchiae  petecchiae      Hydrochlorothiazide W-Triamterene      petecchiae  petecchiae      Lisinopril Cough     Cough  Cough  Other reaction(s): Cough  Cough      Other Drug Allergy (See Comments)      Other reaction(s): Other - Describe In Comment Field  Allergic reaction to IV dye for eye exam. Patient had severe sneezing, congestion, and swelling around eyes.    Latex Rash     Adhesive tape with breast surgery  Adhesive tape with breast surgery  Adhesive tape with breast surgery          Social History     Tobacco Use    Smoking status: Former     Current packs/day: 0.00     Types: Cigarettes     Quit date: 1998     Years since quittin.3    Smokeless tobacco: Never    Tobacco comments:     Smokes very seldom   Substance Use Topics    Alcohol use: Yes     Comment: couple glasses of wine or liquer       History   Drug Use Unknown         Review of Systems   Constitutional:  Negative for chills and fever.   HENT:  Negative for congestion, ear pain and sore throat.    Eyes:  Negative for pain and visual disturbance.   Respiratory:  Negative for cough and shortness of breath.    Cardiovascular:  Negative for chest pain, palpitations and peripheral edema.   Gastrointestinal:  Negative for abdominal pain, constipation and diarrhea.   Endocrine: Negative for polyuria.   Genitourinary:  Negative for dysuria, frequency and vaginal discharge.   Musculoskeletal:  Negative for arthralgias and myalgias.   Skin:  Negative for rash.   Neurological:  Negative for dizziness, weakness, headaches and paresthesias.   Hematological:  Does not bruise/bleed easily.   Psychiatric/Behavioral: Negative.           Objective    /84 (BP Location: Left arm, Patient Position: Sitting, Cuff Size: Adult Regular)   Pulse 75   Temp 98  F (36.7  C) (Oral)   Resp 16   Ht 1.664 m (5' 5.5\")  " " Wt 67.9 kg (149 lb 9.6 oz)   SpO2 96%   BMI 24.52 kg/m     Estimated body mass index is 24.52 kg/m  as calculated from the following:    Height as of this encounter: 1.664 m (5' 5.5\").    Weight as of this encounter: 67.9 kg (149 lb 9.6 oz).  Physical Exam  Constitutional:       Appearance: Normal appearance.   HENT:      Head: Normocephalic and atraumatic.      Right Ear: Tympanic membrane, ear canal and external ear normal.      Left Ear: Tympanic membrane, ear canal and external ear normal.      Nose: Nose normal.      Mouth/Throat:      Mouth: Mucous membranes are moist.      Pharynx: Oropharynx is clear.   Eyes:      Conjunctiva/sclera: Conjunctivae normal.      Pupils: Pupils are equal, round, and reactive to light.   Cardiovascular:      Rate and Rhythm: Normal rate and regular rhythm.      Heart sounds: Normal heart sounds.   Pulmonary:      Effort: Pulmonary effort is normal.      Breath sounds: Normal breath sounds.   Abdominal:      General: Bowel sounds are normal.      Palpations: Abdomen is soft.   Musculoskeletal:         General: Normal range of motion.      Cervical back: Normal range of motion and neck supple.   Neurological:      General: No focal deficit present.      Mental Status: She is alert and oriented to person, place, and time.           Recent Labs   Lab Test 06/28/23  0852   HGB 15.1         POTASSIUM 4.5   CR 0.73        Diagnostics  Cbc and cmp ordered today.     Revised Cardiac Risk Index (RCRI)  The patient has the following serious cardiovascular risks for perioperative complications:   - No serious cardiac risks = 0 points     RCRI Interpretation: 0 points: Class I (very low risk - 0.4% complication rate)         Signed Electronically by: Sravanthi Avrey MD  Copy of this evaluation report is provided to requesting physician.    "

## 2024-05-09 LAB
ALBUMIN SERPL BCG-MCNC: 4.2 G/DL (ref 3.5–5.2)
ALP SERPL-CCNC: 89 U/L (ref 40–150)
ALT SERPL W P-5'-P-CCNC: 23 U/L (ref 0–50)
ANION GAP SERPL CALCULATED.3IONS-SCNC: 9 MMOL/L (ref 7–15)
AST SERPL W P-5'-P-CCNC: 26 U/L (ref 0–45)
BILIRUB SERPL-MCNC: 0.5 MG/DL
BUN SERPL-MCNC: 12.5 MG/DL (ref 8–23)
CALCIUM SERPL-MCNC: 9.6 MG/DL (ref 8.8–10.2)
CHLORIDE SERPL-SCNC: 104 MMOL/L (ref 98–107)
CREAT SERPL-MCNC: 0.75 MG/DL (ref 0.51–0.95)
DEPRECATED HCO3 PLAS-SCNC: 25 MMOL/L (ref 22–29)
EGFRCR SERPLBLD CKD-EPI 2021: 83 ML/MIN/1.73M2
GLUCOSE SERPL-MCNC: 107 MG/DL (ref 70–99)
POTASSIUM SERPL-SCNC: 4.4 MMOL/L (ref 3.4–5.3)
PROT SERPL-MCNC: 7.1 G/DL (ref 6.4–8.3)
SODIUM SERPL-SCNC: 138 MMOL/L (ref 135–145)

## 2024-05-23 ENCOUNTER — ANESTHESIA EVENT (OUTPATIENT)
Dept: SURGERY | Facility: HOSPITAL | Age: 74
End: 2024-05-23
Payer: MEDICARE

## 2024-05-24 ENCOUNTER — HOSPITAL ENCOUNTER (OUTPATIENT)
Facility: HOSPITAL | Age: 74
Discharge: HOME | End: 2024-05-24
Attending: INTERNAL MEDICINE | Admitting: INTERNAL MEDICINE
Payer: MEDICARE

## 2024-05-24 ENCOUNTER — ANESTHESIA (OUTPATIENT)
Dept: SURGERY | Facility: HOSPITAL | Age: 74
End: 2024-05-24
Payer: MEDICARE

## 2024-05-24 VITALS
HEART RATE: 61 BPM | BODY MASS INDEX: 23.3 KG/M2 | SYSTOLIC BLOOD PRESSURE: 138 MMHG | TEMPERATURE: 97.6 F | HEIGHT: 66 IN | WEIGHT: 145 LBS | DIASTOLIC BLOOD PRESSURE: 78 MMHG | OXYGEN SATURATION: 97 % | RESPIRATION RATE: 16 BRPM

## 2024-05-24 LAB — LOWER EUS: NORMAL

## 2024-05-24 PROCEDURE — 360N000076 HC SURGERY LEVEL 3, PER MIN: Performed by: INTERNAL MEDICINE

## 2024-05-24 PROCEDURE — 250N000011 HC RX IP 250 OP 636

## 2024-05-24 PROCEDURE — 250N000009 HC RX 250

## 2024-05-24 PROCEDURE — 258N000003 HC RX IP 258 OP 636: Performed by: ANESTHESIOLOGY

## 2024-05-24 PROCEDURE — 370N000017 HC ANESTHESIA TECHNICAL FEE, PER MIN: Performed by: INTERNAL MEDICINE

## 2024-05-24 PROCEDURE — 999N000141 HC STATISTIC PRE-PROCEDURE NURSING ASSESSMENT: Performed by: INTERNAL MEDICINE

## 2024-05-24 PROCEDURE — 710N000012 HC RECOVERY PHASE 2, PER MINUTE: Performed by: INTERNAL MEDICINE

## 2024-05-24 RX ORDER — PROPOFOL 10 MG/ML
INJECTION, EMULSION INTRAVENOUS CONTINUOUS PRN
Status: DISCONTINUED | OUTPATIENT
Start: 2024-05-24 | End: 2024-05-24

## 2024-05-24 RX ORDER — ONDANSETRON 2 MG/ML
4 INJECTION INTRAMUSCULAR; INTRAVENOUS EVERY 30 MIN PRN
Status: DISCONTINUED | OUTPATIENT
Start: 2024-05-24 | End: 2024-05-24 | Stop reason: HOSPADM

## 2024-05-24 RX ORDER — OXYCODONE HCL 5 MG/5 ML
5 SOLUTION, ORAL ORAL
Status: DISCONTINUED | OUTPATIENT
Start: 2024-05-24 | End: 2024-05-24 | Stop reason: HOSPADM

## 2024-05-24 RX ORDER — DEXAMETHASONE SODIUM PHOSPHATE 10 MG/ML
4 INJECTION, SOLUTION INTRAMUSCULAR; INTRAVENOUS
Status: DISCONTINUED | OUTPATIENT
Start: 2024-05-24 | End: 2024-05-24 | Stop reason: HOSPADM

## 2024-05-24 RX ORDER — ACETAMINOPHEN 325 MG/1
975 TABLET ORAL ONCE
Status: DISCONTINUED | OUTPATIENT
Start: 2024-05-24 | End: 2024-05-24 | Stop reason: HOSPADM

## 2024-05-24 RX ORDER — PROCHLORPERAZINE MALEATE 5 MG
5 TABLET ORAL EVERY 6 HOURS PRN
Status: CANCELLED | OUTPATIENT
Start: 2024-05-24

## 2024-05-24 RX ORDER — OXYCODONE HCL 5 MG/5 ML
10 SOLUTION, ORAL ORAL
Status: DISCONTINUED | OUTPATIENT
Start: 2024-05-24 | End: 2024-05-24 | Stop reason: HOSPADM

## 2024-05-24 RX ORDER — LIDOCAINE HYDROCHLORIDE 10 MG/ML
INJECTION, SOLUTION INFILTRATION; PERINEURAL PRN
Status: DISCONTINUED | OUTPATIENT
Start: 2024-05-24 | End: 2024-05-24

## 2024-05-24 RX ORDER — FLUMAZENIL 0.1 MG/ML
0.2 INJECTION, SOLUTION INTRAVENOUS
Status: CANCELLED | OUTPATIENT
Start: 2024-05-24 | End: 2024-05-24

## 2024-05-24 RX ORDER — ONDANSETRON 2 MG/ML
INJECTION INTRAMUSCULAR; INTRAVENOUS PRN
Status: DISCONTINUED | OUTPATIENT
Start: 2024-05-24 | End: 2024-05-24

## 2024-05-24 RX ORDER — NALOXONE HYDROCHLORIDE 0.4 MG/ML
0.1 INJECTION, SOLUTION INTRAMUSCULAR; INTRAVENOUS; SUBCUTANEOUS
Status: DISCONTINUED | OUTPATIENT
Start: 2024-05-24 | End: 2024-05-24 | Stop reason: HOSPADM

## 2024-05-24 RX ORDER — FENTANYL CITRATE 50 UG/ML
50 INJECTION, SOLUTION INTRAMUSCULAR; INTRAVENOUS
Status: DISCONTINUED | OUTPATIENT
Start: 2024-05-24 | End: 2024-05-24 | Stop reason: HOSPADM

## 2024-05-24 RX ORDER — SODIUM CHLORIDE, SODIUM LACTATE, POTASSIUM CHLORIDE, CALCIUM CHLORIDE 600; 310; 30; 20 MG/100ML; MG/100ML; MG/100ML; MG/100ML
INJECTION, SOLUTION INTRAVENOUS CONTINUOUS
Status: DISCONTINUED | OUTPATIENT
Start: 2024-05-24 | End: 2024-05-24 | Stop reason: HOSPADM

## 2024-05-24 RX ORDER — ONDANSETRON 4 MG/1
4 TABLET, ORALLY DISINTEGRATING ORAL EVERY 6 HOURS PRN
Status: CANCELLED | OUTPATIENT
Start: 2024-05-24

## 2024-05-24 RX ORDER — LIDOCAINE 40 MG/G
CREAM TOPICAL
Status: DISCONTINUED | OUTPATIENT
Start: 2024-05-24 | End: 2024-05-24 | Stop reason: HOSPADM

## 2024-05-24 RX ORDER — ONDANSETRON 4 MG/1
4 TABLET, ORALLY DISINTEGRATING ORAL EVERY 30 MIN PRN
Status: DISCONTINUED | OUTPATIENT
Start: 2024-05-24 | End: 2024-05-24 | Stop reason: HOSPADM

## 2024-05-24 RX ORDER — ONDANSETRON 2 MG/ML
4 INJECTION INTRAMUSCULAR; INTRAVENOUS EVERY 6 HOURS PRN
Status: CANCELLED | OUTPATIENT
Start: 2024-05-24

## 2024-05-24 RX ADMIN — ONDANSETRON 4 MG: 2 INJECTION INTRAMUSCULAR; INTRAVENOUS at 08:05

## 2024-05-24 RX ADMIN — PROPOFOL 200 MCG/KG/MIN: 10 INJECTION, EMULSION INTRAVENOUS at 08:05

## 2024-05-24 RX ADMIN — LIDOCAINE HYDROCHLORIDE 3 ML: 10 INJECTION, SOLUTION INFILTRATION; PERINEURAL at 08:05

## 2024-05-24 RX ADMIN — SODIUM CHLORIDE, POTASSIUM CHLORIDE, SODIUM LACTATE AND CALCIUM CHLORIDE: 600; 310; 30; 20 INJECTION, SOLUTION INTRAVENOUS at 07:29

## 2024-05-24 RX ADMIN — LIDOCAINE HYDROCHLORIDE 2 ML: 10 INJECTION, SOLUTION INFILTRATION; PERINEURAL at 08:11

## 2024-05-24 ASSESSMENT — ACTIVITIES OF DAILY LIVING (ADL)
ADLS_ACUITY_SCORE: 35

## 2024-05-24 NOTE — ANESTHESIA POSTPROCEDURE EVALUATION
Patient: Mihaela Pro    Procedure: Procedure(s):  ENDOSCOPIC ULTRASOUND LOWER TRACT       Anesthesia Type:  MAC    Note:  Disposition: Outpatient   Postop Pain Control: Uneventful            Sign Out: Well controlled pain   PONV: No   Neuro/Psych:    Airway/Respiratory: Uneventful            Sign Out: Acceptable/Baseline resp. status   CV/Hemodynamics: Uneventful            Sign Out: Acceptable CV status; No obvious hypovolemia; No obvious fluid overload   Other NRE: NONE   DID A NON-ROUTINE EVENT OCCUR? No           Last vitals:  Vitals Value Taken Time   /78 05/24/24 0948   Temp 36.4  C (97.6  F) 05/24/24 0948   Pulse 67 05/24/24 0955   Resp 16 05/24/24 0948   SpO2 98 % 05/24/24 0955   Vitals shown include unfiled device data.    Electronically Signed By: Yoselyn Carpenter MD  May 24, 2024  12:31 PM

## 2024-05-24 NOTE — ANESTHESIA PREPROCEDURE EVALUATION
"Anesthesia Pre-Procedure Evaluation    Patient: Mihaela Pro   MRN: [id number] : 1950        Procedure : Procedure(s):  ENDOSCOPIC ULTRASOUND LOWER TRACT          Past Medical History:   Diagnosis Date    Allergic rhinitis     Anemia     Ascending aorta dilation (H24)     Colonic mass     Degenerative joint disease of left acromioclavicular joint     Fibromyalgia     Hallux limitus, right     History of blood transfusion     Hypertension     Hypothyroidism     Osteopenia     PONV (postoperative nausea and vomiting)     Primary osteoarthritis of right shoulder     Pulmonary nodules     Sensorineural hearing loss of both ears     Spondylosis of lumbar region without myelopathy or radiculopathy     Tendinopathy of left biceps tendon     Thyroid disease     Tobacco use disorder 2023    Tubular adenoma of colon     UARS (upper airway resistance syndrome)       Past Surgical History:   Procedure Laterality Date    COLONOSCOPY N/A 2024    Procedure: COLONOSCOPY WITH POLYPECTOMY AND BIOPSY;  Surgeon: Cece Logan MD;  Location: [facility]    MAMMOPLASTY AUGMENTATION Bilateral 2010    replaced in ..had them before that    ORTHOPEDIC SURGERY      Shoulder surgery      Allergies   Allergen Reactions    Iodinated Contrast Media Anaphylaxis    Codeine Unknown     Nausea  Nausea  Nausea      D&C Red #22 Unknown     Other reaction(s): *Unknown  Other reaction(s): *Unknown      Diatrizoate Other (See Comments)     Patient states \"had a serious allergic reaction\"  Patient states \"had a serious allergic reaction\"  Other reaction(s): Other - Describe In Comment Field  Patient states \"had a serious allergic reaction\"      Diltiazem Unknown     rash  rash  rash      Furosemide Unknown     petecchiae  petecchiae  petecchiae      Hydrochlorothiazide W-Triamterene      petecchiae  petecchiae      Lisinopril Cough     Cough  Cough  Other reaction(s): Cough  Cough      Other Drug Allergy (See " Comments)      Other reaction(s): Other - Describe In Comment Field  Allergic reaction to IV dye for eye exam. Patient had severe sneezing, congestion, and swelling around eyes.    Latex Rash     Adhesive tape with breast surgery  Adhesive tape with breast surgery  Adhesive tape with breast surgery        Social History     Tobacco Use    Smoking status: Former     Current packs/day: 0.00     Types: Cigarettes     Quit date: 1998     Years since quittin.4    Smokeless tobacco: Never    Tobacco comments:     Smokes very seldom   Substance Use Topics    Alcohol use: Yes     Comment: couple glasses of wine or liquer      Wt Readings from Last 1 Encounters:   24 65.8 kg (145 lb)        Anesthesia Evaluation   Pt has had prior anesthetic.     History of anesthetic complications  - PONV.      ROS/MED HX  ENT/Pulmonary:       Neurologic:       Cardiovascular:     (+)  hypertension- -   -  - -                                      METS/Exercise Tolerance:     Hematologic:       Musculoskeletal:       GI/Hepatic:       Renal/Genitourinary:       Endo:     (+)          thyroid problem,            Psychiatric/Substance Use:       Infectious Disease:       Malignancy:       Other:            Physical Exam    Airway        Mallampati: II    Neck ROM: full     Respiratory Devices and Support         Dental       (+) Minor Abnormalities - some fillings, tiny chips      Cardiovascular   cardiovascular exam normal          Pulmonary   pulmonary exam normal                OUTSIDE LABS:  CBC:   Lab Results   Component Value Date    WBC 7.0 2024    WBC 5.3 2023    HGB 14.0 2024    HGB 15.1 2023    HCT 40.7 2024    HCT 43.8 2023     2024     2023     BMP:   Lab Results   Component Value Date     2024     2023    POTASSIUM 4.4 2024    POTASSIUM 4.5 2023    CHLORIDE 104 2024    CHLORIDE 106 2023    CO2 25 2024  "   CO2 24 06/28/2023    BUN 12.5 05/08/2024    BUN 11.1 06/28/2023    CR 0.75 05/08/2024    CR 0.73 06/28/2023     (H) 05/08/2024     (H) 06/28/2023     COAGS: No results found for: \"PTT\", \"INR\", \"FIBR\"  POC: No results found for: \"BGM\", \"HCG\", \"HCGS\"  HEPATIC:   Lab Results   Component Value Date    ALBUMIN 4.2 05/08/2024    PROTTOTAL 7.1 05/08/2024    ALT 23 05/08/2024    AST 26 05/08/2024    ALKPHOS 89 05/08/2024    BILITOTAL 0.5 05/08/2024     OTHER:   Lab Results   Component Value Date    SEGUNDO 9.6 05/08/2024    TSH 1.46 06/28/2023       Anesthesia Plan    ASA Status:  2       Anesthesia Type: MAC.              Consents    Anesthesia Plan(s) and associated risks, benefits, and realistic alternatives discussed. Questions answered and patient/representative(s) expressed understanding.     - Discussed: Risks, Benefits and Alternatives for the PROCEDURE were discussed     - Discussed with:  Patient      - Extended Intubation/Ventilatory Support Discussed: No.      - Patient is DNR/DNI Status: No     Use of blood products discussed: No .     Postoperative Care    Pain management: Multi-modal analgesia.   PONV prophylaxis: Ondansetron (or other 5HT-3), Dexamethasone or Solumedrol     Comments:               Yoselyn Carpenter MD    I have reviewed the pertinent notes and labs in the chart from the past 30 days and (re)examined the patient.  Any updates or changes from those notes are reflected in this note.                  "

## 2024-05-24 NOTE — ANESTHESIA CARE TRANSFER NOTE
Patient: Mihaela Pro    Procedure: Procedure(s):  ENDOSCOPIC ULTRASOUND LOWER TRACT       Diagnosis: Mass of colon [K63.89]  Diagnosis Additional Information: No value filed.    Anesthesia Type:   MAC     Note:    Oropharynx: oropharynx clear of all foreign objects and spontaneously breathing  Level of Consciousness: drowsy and awake  Oxygen Supplementation: room air    Independent Airway: airway patency satisfactory and stable  Dentition: dentition unchanged  Vital Signs Stable: post-procedure vital signs reviewed and stable  Report to RN Given: handoff report given  Patient transferred to: Phase II    Handoff Report: Identifed the Patient, Identified the Reponsible Provider, Reviewed the pertinent medical history, Discussed the surgical course, Reviewed Intra-OP anesthesia mangement and issues during anesthesia, Set expectations for post-procedure period and Allowed opportunity for questions and acknowledgement of understanding    Vitals:  Vitals Value Taken Time   /62 05/24/24 0843   Temp 36.6  C (97.9  F) 05/24/24 0843   Pulse 60 05/24/24 0849   Resp 16 05/24/24 0843   SpO2 99 % 05/24/24 0849   Vitals shown include unfiled device data.    Electronically Signed By: IAN Parada CRNA  May 24, 2024  8:50 AM

## 2024-05-24 NOTE — H&P
GENERAL PRE-PROCEDURE:   Procedure:  Lower EUS - Sigmoid submucosal Lesion  Date/Time:  5/24/2024 6:59 AM    Verbal consent obtained?: Yes    Risks and benefits: Risks, benefits and alternatives were discussed    Consent given by:  Patient  Patient states understanding of procedure being performed: Yes    Patient's understanding of procedure matches consent: Yes    Procedure consent matches procedure scheduled: Yes    Expected level of sedation:  Deep  Appropriately NPO:  Yes  ASA Class:  3  Mallampati  :  Grade 2- soft palate, base of uvula, tonsillar pillars, and portion of posterior pharyngeal wall visible  Lungs:  Lungs clear with good breath sounds bilaterally  Heart:  Normal heart sounds and rate  History & Physical reviewed:  History and physical reviewed and no updates needed  Statement of review:  I have reviewed the lab findings, diagnostic data, medications, and the plan for sedation

## 2024-05-28 ENCOUNTER — DOCUMENTATION ONLY (OUTPATIENT)
Dept: OTHER | Facility: CLINIC | Age: 74
End: 2024-05-28
Payer: MEDICARE

## 2024-06-14 ENCOUNTER — MYC MEDICAL ADVICE (OUTPATIENT)
Dept: FAMILY MEDICINE | Facility: CLINIC | Age: 74
End: 2024-06-14
Payer: MEDICARE

## 2024-06-14 NOTE — TELEPHONE ENCOUNTER
Needs visit or route to ordering provider. I have never prescribed that for her before and I do not like using NP THYROID (AKLACEY rivera) because I have had bad side effects leading to hospitalization in patient before.will need to discuss.    2 pair

## 2024-06-25 NOTE — TELEPHONE ENCOUNTER
Pt returned call, message relayed. She will discuss at upcoming appointment of 07/30/24. She declined moving appointment up to a sooner date and states that she is currently fine and will wait.

## 2024-07-09 ENCOUNTER — OFFICE VISIT (OUTPATIENT)
Dept: PLASTIC SURGERY | Facility: CLINIC | Age: 74
End: 2024-07-09
Payer: MEDICARE

## 2024-07-09 DIAGNOSIS — R22.0 FACIAL MASS: Primary | ICD-10-CM

## 2024-07-09 NOTE — PROGRESS NOTES
Facial Plastic and Reconstructive Surgery Consultation      HPI:   I had the pleasure of seeing Mihaela Pro today in clinic for consultation for a facial mass. Mihaela Pro is a 73 year old female. She has a history of surgery with Dr. Butcher in the past, including face/neck lift and eyelid surgery. She reports that she had filler placed at an outside clinic about 4 months ago (to midface and prejowl region). About 2 months later, she developed some nodules in the perioral region that have since resolved using massage and a vibrating tuning fork. She noticed an area under her left eye, however, that seems to actually be growing. She reports it is under her left eye, she sees it when she looks down. It seems to be getting larger. It is non-tender. She has no numbness or tingling. She has never had anything like this before. No overlying skin changes. She reports she has never had filler placed in this area and when she went back to the clinic that placed the filler they said they had never placed it there. She has had a couple of friends with bone cancer and she is concerned about that.         Review Of Systems  ROS: 10 point ROS neg other than the symptoms noted above in the HPI.    Patient Active Problem List   Diagnosis    Health care maintenance    Tubular adenoma of colon    Dizziness    Allergic rhinitis    Ascending aorta dilation (H24)    Fibromyalgia    Uncontrolled hypertension    Hypothyroidism    Osteopenia    Pulmonary nodule    Sensorineural hearing loss of both ears    UARS (upper airway resistance syndrome)    Colonic mass     Past Surgical History:   Procedure Laterality Date    COLONOSCOPY N/A 03/04/2024    Procedure: COLONOSCOPY WITH POLYPECTOMY AND BIOPSY;  Surgeon: Cece Logan MD;  Location: [facility] OR    ESOPHAGOSCOPY, GASTROSCOPY, DUODENOSCOPY (EGD), COMBINED N/A 5/24/2024    Procedure: ENDOSCOPIC ULTRASOUND LOWER TRACT;  Surgeon: Abraham Pederson MD;  Location:   [facility]    MAMMOPLASTY AUGMENTATION Bilateral 2010    replaced in ..had them before that    ORTHOPEDIC SURGERY      Shoulder surgery     Current Outpatient Medications   Medication Sig Dispense Refill    amLODIPine (NORVASC) 5 MG tablet Take 1 tablet (5 mg) by mouth daily 30 tablet 1    clobetasol (TEMOVATE) 0.05 % external ointment APPLY TO THE AFFECTED AREA OF LEG DAILY AS NEEDED.      Coenzyme Q10 100 MG/ML LIQD       losartan (COZAAR) 100 MG tablet       meclizine (ANTIVERT) 12.5 MG tablet Take 1 tablet (12.5 mg) by mouth 3 times daily as needed for dizziness 90 tablet 5    NP THYROID 15 MG tablet Take 15 mg by mouth every 48 hours      traZODone (DESYREL) 50 MG tablet Take 50 mg by mouth as needed for sleep       Iodinated contrast media, Codeine, D&c red #22, Diatrizoate, Diltiazem, Furosemide, Hydrochlorothiazide w-triamterene, Lisinopril, Other drug allergy (see comments), and Latex  Social History     Socioeconomic History    Marital status:      Spouse name: Not on file    Number of children: Not on file    Years of education: Not on file    Highest education level: Not on file   Occupational History    Not on file   Tobacco Use    Smoking status: Former     Current packs/day: 0.00     Types: Cigarettes     Quit date: 1998     Years since quittin.5    Smokeless tobacco: Never    Tobacco comments:     Smokes very seldom   Vaping Use    Vaping status: Never Used   Substance and Sexual Activity    Alcohol use: Yes     Comment: couple glasses of wine or liquer    Drug use: Never    Sexual activity: Not Currently     Partners: Male   Other Topics Concern    Parent/sibling w/ CABG, MI or angioplasty before 65F 55M? No   Social History Narrative    2023 lives alone. .     Social Determinants of Health     Financial Resource Strain: Low Risk  (2024)    Financial Resource Strain     Within the past 12 months, have you or your family members you live with been unable  to get utilities (heat, electricity) when it was really needed?: No   Food Insecurity: Low Risk  (1/23/2024)    Food Insecurity     Within the past 12 months, did you worry that your food would run out before you got money to buy more?: No     Within the past 12 months, did the food you bought just not last and you didn t have money to get more?: No   Transportation Needs: Low Risk  (1/23/2024)    Transportation Needs     Within the past 12 months, has lack of transportation kept you from medical appointments, getting your medicines, non-medical meetings or appointments, work, or from getting things that you need?: No   Physical Activity: Not on file   Stress: Not on file   Social Connections: Unknown (1/1/2024)    [facility]    Social Connections     Frequency of Communication with Friends and Family: Not on file   Interpersonal Safety: Low Risk  (11/22/2023)    Interpersonal Safety     Do you feel physically and emotionally safe where you currently live?: Yes     Within the past 12 months, have you been hit, slapped, kicked or otherwise physically hurt by someone?: No     Within the past 12 months, have you been humiliated or emotionally abused in other ways by your partner or ex-partner?: No   Housing Stability: Low Risk  (1/23/2024)    Housing Stability     Do you have housing? : Yes     Are you worried about losing your housing?: No     Family History   Problem Relation Age of Onset    No Known Problems Mother     No Known Problems Father     No Known Problems Sister     No Known Problems Daughter     No Known Problems Maternal Grandmother     No Known Problems Maternal Grandfather     No Known Problems Paternal Grandmother     No Known Problems Paternal Grandfather     No Known Problems Maternal Aunt     No Known Problems Paternal Aunt     Hereditary Breast and Ovarian Cancer Syndrome No family hx of     Breast Cancer  No family hx of     Cancer No family hx of     Colon Cancer No family hx of     Endometrial Cancer No family hx of     Ovarian Cancer No family hx of        PE:  Alert and Oriented, Answering Questions Appropriately  Atraumatic, Normocephalic, Face Symmetric  Skin: Johnson 2  Facial Nerve Intact and facial movement symmetric  There is an area of fullness (~1cm) along the mid left inferior orbital rim. This is non-tender to palpation. It seems to be either within the orbicularis muscle or deep to it.   EOMI  Nasal Exam: No external Deformity  Chin: Normal   Lips/Teeth/Toungue/Gums: Lips intact  Neck: Trachea midline  Chest: No wheezing, cyanosis, or stridor  Card: not diaphoretic  Neuro/Psych: CN's 2-12 intact, Moves all extremities, ambulation in intact, positive affect, no notable muscle weakness           IMPRESSION/PLAN: Mihaela Pro is a 73 year old female with an area of fullness at the left inferior orbital rim that is progressively enlarging. On exam, there is an area of fullness in this location that appears to be either within the orbicularis muscle or deep to it. She has had surgeries in this area in the past, but I do not have access to those op notes. She also reports this is new and actually enlarging. Because of this, we will get a CT scan to further evaluate. I will call her when the results return and we will determine next steps at that time.     Photodocumentation was obtained.

## 2024-07-22 ENCOUNTER — MYC MEDICAL ADVICE (OUTPATIENT)
Dept: PLASTIC SURGERY | Facility: CLINIC | Age: 74
End: 2024-07-22

## 2024-07-31 ENCOUNTER — OFFICE VISIT (OUTPATIENT)
Dept: FAMILY MEDICINE | Facility: CLINIC | Age: 74
End: 2024-07-31
Payer: MEDICARE

## 2024-07-31 VITALS
HEART RATE: 71 BPM | RESPIRATION RATE: 16 BRPM | DIASTOLIC BLOOD PRESSURE: 86 MMHG | BODY MASS INDEX: 23.48 KG/M2 | WEIGHT: 146.1 LBS | TEMPERATURE: 97.7 F | OXYGEN SATURATION: 97 % | SYSTOLIC BLOOD PRESSURE: 149 MMHG | HEIGHT: 66 IN

## 2024-07-31 DIAGNOSIS — K63.89 COLONIC MASS: ICD-10-CM

## 2024-07-31 DIAGNOSIS — E03.9 HYPOTHYROIDISM, UNSPECIFIED TYPE: ICD-10-CM

## 2024-07-31 DIAGNOSIS — Z00.00 HEALTH CARE MAINTENANCE: ICD-10-CM

## 2024-07-31 DIAGNOSIS — I10 UNCONTROLLED HYPERTENSION: ICD-10-CM

## 2024-07-31 DIAGNOSIS — N90.7 LABIAL CYST: ICD-10-CM

## 2024-07-31 DIAGNOSIS — Z23 NEED FOR TDAP VACCINATION: ICD-10-CM

## 2024-07-31 DIAGNOSIS — Z29.11 NEED FOR VACCINATION AGAINST RESPIRATORY SYNCYTIAL VIRUS: ICD-10-CM

## 2024-07-31 DIAGNOSIS — Z11.59 NEED FOR HEPATITIS C SCREENING TEST: Primary | ICD-10-CM

## 2024-07-31 PROCEDURE — 99214 OFFICE O/P EST MOD 30 MIN: CPT | Performed by: FAMILY MEDICINE

## 2024-07-31 RX ORDER — AMLODIPINE BESYLATE 10 MG/1
10 TABLET ORAL DAILY
Status: CANCELLED | OUTPATIENT
Start: 2024-07-31

## 2024-07-31 NOTE — PROGRESS NOTES
Problem List Items Addressed This Visit          Digestive    Colonic mass     Mass had resolved on reevaluation done 5/24/24            Endocrine    Hypothyroidism     Hypothyroid, managed by naturopathic doctor and she declines thyroid blood tests and is using armour thyroid through naturopathic doctor.             Circulatory    Uncontrolled hypertension     Hypertension controlled. Cozaar 100mg po q day, norvasc 5 mg po q day. Discussed increasing norvasc but she believes this is unusual today. Since it is isolated so will continue to monitor.   Nurse visit planned to recheck.             Urinary    Labial cyst     Left posterior labial cyst - 8 mm in diameter, not tender, appears to have white head, with benign appearance, she prefers to watch and wait.             Other    Health care maintenance     Annual wellness visit due, she is asked to schedule.   Tdap, rsv offered today and she declined both but plans to get tdap in future.           Other Visit Diagnoses       Need for hepatitis C screening test    -  Primary    Relevant Orders    Hepatitis C antibody    Need for Tdap vaccination        Need for vaccination against respiratory syncytial virus                 Ssuan Chairez is a 73 year old, presenting for the following health issues:  Recheck Medication (Discuss thyroid medication) and Derm Problem (Notice bump on labia x 1 wk )      7/31/2024     1:34 PM   Additional Questions   Roomed by Torin Linder MA   Accompanied by Self         7/31/2024     1:34 PM   Patient Reported Additional Medications   Patient reports taking the following new medications None     History of Present Illness       Hypertension: She presents for follow up of hypertension.  She does not check blood pressure  regularly outside of the clinic. Outpatient blood pressures have not been over 140/90. She does not follow a low salt diet.     She eats 2-3 servings of fruits and vegetables daily.She consumes 0 sweetened  "beverage(s) daily.She exercises with enough effort to increase her heart rate 20 to 29 minutes per day.  She exercises with enough effort to increase her heart rate 4 days per week.   She is taking medications regularly.           Objective    BP (!) 149/86 (BP Location: Left arm, Patient Position: Sitting, Cuff Size: Adult Regular)   Pulse 71   Temp 97.7  F (36.5  C) (Oral)   Resp 16   Ht 1.676 m (5' 6\")   Wt 66.3 kg (146 lb 1.6 oz)   SpO2 97%   BMI 23.58 kg/m    Body mass index is 23.58 kg/m .  Physical Exam  Constitutional:       Appearance: Normal appearance.   HENT:      Head: Normocephalic and atraumatic.   Cardiovascular:      Rate and Rhythm: Normal rate and regular rhythm.   Pulmonary:      Effort: Pulmonary effort is normal.   Genitourinary:      Musculoskeletal:         General: Normal range of motion.      Cervical back: Normal range of motion and neck supple.   Neurological:      General: No focal deficit present.      Mental Status: She is alert and oriented to person, place, and time.                Signed Electronically by: Sravanthi Avery MD    "

## 2024-07-31 NOTE — ASSESSMENT & PLAN NOTE
Hypothyroid, managed by naturopathic doctor and she declines thyroid blood tests and is using armour thyroid through naturopathic doctor.

## 2024-07-31 NOTE — ASSESSMENT & PLAN NOTE
Left posterior labial cyst - 8 mm in diameter, not tender, appears to have white head, with benign appearance, she prefers to watch and wait.

## 2024-07-31 NOTE — ASSESSMENT & PLAN NOTE
Annual wellness visit due, she is asked to schedule.   Tdap, rsv offered today and she declined both but plans to get tdap in future.

## 2024-07-31 NOTE — ASSESSMENT & PLAN NOTE
Hypertension controlled. Cozaar 100mg po q day, norvasc 5 mg po q day. Discussed increasing norvasc but she believes this is unusual today. Since it is isolated so will continue to monitor.   Nurse visit planned to recheck.

## 2024-08-11 ENCOUNTER — HEALTH MAINTENANCE LETTER (OUTPATIENT)
Age: 74
End: 2024-08-11

## 2024-09-09 ENCOUNTER — MYC REFILL (OUTPATIENT)
Dept: NEUROLOGY | Facility: CLINIC | Age: 74
End: 2024-09-09
Payer: MEDICARE

## 2024-09-09 DIAGNOSIS — R42 DIZZINESS: ICD-10-CM

## 2024-09-09 NOTE — TELEPHONE ENCOUNTER
Medication: meclizine (ANTIVERT) 12.5 MG tablet   Sig: Take 1 tablet (12.5 mg) by mouth 3 times daily as needed for dizziness   Date last written: 10/4/2023  Dispensed amount: 90 tablet  Refills: 5    Requested Pharmacy: [id number] [place], [place] - [address] AT [facility] [address]     Pt's last office visit: 10/4/2023  Next scheduled office visit: Patient now scheduled 1/2/25      Per the RN/LPN medication refill protocol, writer is unable to refill this request.     A VM was left for patient requesting a call back to schedule a follow up. A [employer] message has also been sent to the patient. Also will need to confirm that she is tolerating the meclizine 12.5 MG tablet. There was an allergy alert for D&C Red #22.      NORTH PattersonN RN Care Coordinator  Neurology/Neurosurgery/PM&R/Pain Management

## 2024-09-11 RX ORDER — MECLIZINE HCL 12.5 MG 12.5 MG/1
12.5 TABLET ORAL 3 TIMES DAILY PRN
Qty: 90 TABLET | Refills: 5 | Status: SHIPPED | OUTPATIENT
Start: 2024-09-11

## 2024-10-01 DIAGNOSIS — I10 UNCONTROLLED HYPERTENSION: ICD-10-CM

## 2024-10-01 RX ORDER — AMLODIPINE BESYLATE 5 MG/1
5 TABLET ORAL DAILY
Qty: 30 TABLET | Refills: 1 | OUTPATIENT
Start: 2024-10-01

## 2024-10-01 NOTE — TELEPHONE ENCOUNTER
Left message to call back for: pt  Information to relay to patient: last rx'd 11/2023, confirm gap in medication

## 2024-10-01 NOTE — TELEPHONE ENCOUNTER
Spoke with patient - Patient has scheduled follow up with Gena 10/09/2024. Patient states she is okay on medication until her visit with Gena. She believes her cardiologist had previously prescribed this medication.

## 2024-10-01 NOTE — TELEPHONE ENCOUNTER
Ok to continue medication (bridge to appt), but pt is due for bp follow-up to ensure bp is at goal. Please schedule.

## 2024-10-25 ENCOUNTER — TELEPHONE (OUTPATIENT)
Dept: NEUROLOGY | Facility: CLINIC | Age: 74
End: 2024-10-25
Payer: MEDICARE

## 2024-10-25 NOTE — TELEPHONE ENCOUNTER
Left Voicemail (1st Attempt) and Sent [employer] (1st Attempt) for the patient to call back and schedule the following:    Appointment type: Return Neuro  Provider: Dr. Null  Return date: next available, offer virtual  Specialty phone number: [phone]  Additional appointment(s) needed:   Additonal Notes:     Attempted to reschedule the appointment with Dr. Null on 1/2/2025, Provider attending/precepting.    Also sent a [employer] message and appointment reschedule letter.    Fay SALMERON/Cookie Procedure    [facility]   Neurology, NeuroSurgery, NeuroPsychology, Pain Management and Cardiology Specialties  Medical/Surgical Adult Specialties

## 2024-12-09 ENCOUNTER — TRANSFERRED RECORDS (OUTPATIENT)
Dept: HEALTH INFORMATION MANAGEMENT | Facility: CLINIC | Age: 74
End: 2024-12-09

## 2024-12-09 ENCOUNTER — VIRTUAL VISIT (OUTPATIENT)
Dept: NEUROLOGY | Facility: CLINIC | Age: 74
End: 2024-12-09
Payer: MEDICARE

## 2024-12-09 DIAGNOSIS — R42 DIZZINESS: Primary | ICD-10-CM

## 2024-12-09 RX ORDER — LEVOTHYROXINE, LIOTHYRONINE 57; 13.5 UG/1; UG/1
90 TABLET ORAL DAILY
COMMUNITY
Start: 2024-10-16

## 2024-12-09 NOTE — LETTER
12/9/2024      Mihaela Pro  [address]  [place] [address]      Dear Colleague,    Thank you for referring your patient, Mihaela Pro, to the [facility] [place]. Please see a copy of my visit note below.    [facility]/[place]  Section of General Neurology  Return Patient  Virtual Visit    Mihaela Pro MRN# [id number]   Age: 74 year old YOB: 1950            Assessment and Plan:   Assessment:  Mihaela Pro is a pleasant 74 year old female who presents in follow up for dizziness that remains episodic and non specific.   We reviewed previously unrevealing work up in MRI brain w/w/o.  Still episodically unsteady/dizzy.  Discussed future options.  No clear triggers, she notes she is often hydrated.  She is not interested in trailing medications from a migrainous vantage point or SSRIs (fear of side effects).  [facility], given meclizine a longer trial (hasn't wanted to try it too often as was dizzy on 25 mg and often on the go).       Plan:  Re trial meclizine 12.5 mg PRN  [facility]  Discussed follow up timing, settled on in the spring, she can reach out sooner with any issues questions or changes    Fernando Null MD   of Neurology   [facility]      Interval history:     Meclizine makes her too sleepy at higher doses  Some days she really struggles with how dizzy she gets.  It is discouraging  Remains quite episodic  No room spinning   Just poor balance.   Can't walk a straight line with 2 bottle of wine kind of feeling.    Discussed selective serotonin reuptake inhibitor data.    Not interested in more medications.    [facility]  Hearing aids--Kaden--she was happy with this.    Comes and goes.    Is cognizant of her hydration.    Magnesium oxide 400 mg Riboflavin (vitamin b2) 400 mg daily didn't really  help  Anti migraine as needed imitrex a future option.    Pass on imitrex.    She is worried about imitrex side effects.    Prevagen--no merit, discussed.      A/P at last visit  Mihaela Pro is a pleasant 72 year old female who presents in follow up for episodic dizziness.  As noted previously it is long standing and non specific.  Not clearly migrainous and nutritional options therein were not helpful.  Meclizine may have helped but made her sleepy.  She seems to have tried vestibular PT before to no avail.  MRI unrevealing,good news Discussed future options.  Plan as below     Plan:  --Decrease meclizine to 12.5 mg TIDPRN  --Future option: Re trial of vestibular PT, [facility], off label options such as benzodiazepines, selective serotonin reuptake inhibitors can improve dizziness in some patients  --She will reach out with issues questions or changes         Past Medical History:     Patient Active Problem List   Diagnosis     Health care maintenance     Tubular adenoma of colon     Dizziness     Allergic rhinitis     Ascending aorta dilation (H)     Fibromyalgia     Uncontrolled hypertension     Hypothyroidism     Osteopenia     Pulmonary nodule     Sensorineural hearing loss of both ears     UARS (upper airway resistance syndrome)     Colonic mass     Labial cyst     Past Medical History:   Diagnosis Date     Allergic rhinitis      Anemia      Ascending aorta dilation (H)      Colonic mass      Degenerative joint disease of left acromioclavicular joint      Fibromyalgia      Hallux limitus, right      History of blood transfusion      Hypertension      Hypothyroidism      Osteopenia      PONV (postoperative nausea and vomiting)      Primary osteoarthritis of right shoulder      Pulmonary nodules      Sensorineural hearing loss of both ears      Spondylosis of lumbar region without myelopathy or radiculopathy      Tendinopathy of left biceps tendon      Thyroid disease      Tobacco use  disorder 2023     Tubular adenoma of colon      UARS (upper airway resistance syndrome)         Past Surgical History:     Past Surgical History:   Procedure Laterality Date     COLONOSCOPY N/A 2024    Procedure: COLONOSCOPY WITH POLYPECTOMY AND BIOPSY;  Surgeon: Cece Logan MD;  Location: [facility]     ESOPHAGOSCOPY, GASTROSCOPY, DUODENOSCOPY (EGD), COMBINED N/A 2024    Procedure: ENDOSCOPIC ULTRASOUND LOWER TRACT;  Surgeon: Abraham Pederson MD;  Location: [facility] OR     MAMMOPLASTY AUGMENTATION Bilateral 2010    replaced in ..had them before that     ORTHOPEDIC SURGERY      Shoulder surgery        Social History:     Social History     Tobacco Use     Smoking status: Former     Current packs/day: 0.00     Types: Cigarettes     Quit date: 1998     Years since quittin.9     Smokeless tobacco: Never     Tobacco comments:     Smokes very seldom   Vaping Use     Vaping status: Never Used   Substance Use Topics     Alcohol use: Yes     Comment: couple glasses of wine or liquer     Drug use: Never        Family History:     Family History   Problem Relation Age of Onset     No Known Problems Mother      No Known Problems Father      No Known Problems Sister      No Known Problems Daughter      No Known Problems Maternal Grandmother      No Known Problems Maternal Grandfather      No Known Problems Paternal Grandmother      No Known Problems Paternal Grandfather      No Known Problems Maternal Aunt      No Known Problems Paternal Aunt      Hereditary Breast and Ovarian Cancer Syndrome No family hx of      Breast Cancer No family hx of      Cancer No family hx of      Colon Cancer No family hx of      Endometrial Cancer No family hx of      Ovarian Cancer No family hx of         Medications:     Current Outpatient Medications   Medication Sig Dispense Refill     amLODIPine (NORVASC) 5 MG tablet Take 1 tablet (5 mg) by mouth daily 30 tablet 1     clobetasol  "(TEMOVATE) 0.05 % external ointment APPLY TO THE AFFECTED AREA OF LEG DAILY AS NEEDED.       Coenzyme Q10 100 MG/ML LIQD        losartan (COZAAR) 100 MG tablet        meclizine (ANTIVERT) 12.5 MG tablet Take 1 tablet (12.5 mg) by mouth 3 times daily as needed for dizziness. 90 tablet 5     NP THYROID 15 MG tablet Take 15 mg by mouth every 48 hours       traZODone (DESYREL) 50 MG tablet Take 50 mg by mouth as needed for sleep       No current facility-administered medications for this visit.        Allergies:     Allergies   Allergen Reactions     Iodinated Contrast Media Anaphylaxis     Codeine Unknown     Nausea  Nausea  Nausea       Diatrizoate Other (See Comments)     Patient states \"had a serious allergic reaction\"  Patient states \"had a serious allergic reaction\"  Other reaction(s): Other - Describe In Comment Field  Patient states \"had a serious allergic reaction\"       Diltiazem Unknown     rash  rash  rash       Furosemide Unknown     petecchiae  petecchiae  petecchiae       Hydrochlorothiazide W-Triamterene      petecchiae  petecchiae       Lisinopril Cough     Cough  Cough  Other reaction(s): Cough  Cough       Other Drug Allergy (See Comments)      Other reaction(s): Other - Describe In Comment Field  Allergic reaction to IV dye for eye exam. Patient had severe sneezing, congestion, and swelling around eyes.     Red Dye #22 (Eosine) Unknown     Other reaction(s): *Unknown  Other reaction(s): *Unknown       Latex Rash     Adhesive tape with breast surgery  Adhesive tape with breast surgery  Adhesive tape with breast surgery          Review of Systems:   As noted above     Physical Exam:   General: Seated comfortably in no acute distress.  Neurologic:     Mental Status: Fully alert, attentive and oriented. Speech clear and fluent, no paraphasic errors.     Cranial Nerves: EOM appear intact. Facial movements symmetric. Hearing not formally tested but intact to conversation.  No dysarthria.     Motor: No " tremors or other abnormal movements observed.      Sensory:Not able to be tested virtually     Coordination: Not tested     Gait: Not tested         Data: Pertinent prior to visit      Narrative & Impression   MRI OF THE BRAIN WITHOUT AND WITH CONTRAST July 12, 2023 12:48 PM      HISTORY: Exclude secondary dizziness. Dizziness      TECHNIQUE: Axial diffusion-weighted with ADC map, axial T2-weighted  with fat saturation, axial T1-weighted, axial turboFLAIR and coronal  T1-weighted images of the brain were acquired without intravenous  contrast.  Following intravenous administration of gadolinium (6.5 mL  Gadavist), axial T1-weighted images of the brain were acquired.      COMPARISON: None.     FINDINGS: There is moderate diffuse cerebral volume loss. There are  mild patchy periventricular areas of abnormal T2 signal hyperintensity  in the cerebral white matter bilaterally that are consistent with  sequela of chronic small vessel ischemic disease.      The ventricles and basal cisterns are within normal limits in  configuration given the degree of cerebral volume loss. There is no  midline shift. There are no extra-axial fluid collections. There is no  evidence for stroke or acute intracranial hemorrhage. There is no  abnormal contrast enhancement in the brain or its coverings.      There is no sinusitis or mastoiditis.                                                                      IMPRESSION: Diffuse cerebral volume loss and cerebral white matter  changes consistent with chronic small vessel ischemic disease. No  evidence for acute intracranial pathology.                     The total time of this encounter today amounted to 30  minutes in total. This time included time spent with the patient, prep work, ordering tests, and performing post visit documentation.    The longitudinal plan of care for dizziness was addressed during this visit. Due to the added complexity in care, I will continue to support Ms  Morteza in the subsequent management of this condition(s) and with the ongoing continuity of care of this condition(s).      Mihaela is a 74 year old who is being evaluated via a billable video visit.    How would you like to obtain your AVS? MyChart  If the video visit is dropped, the invitation should be resent by: Text to cell phone: [phone]  Will anyone else be joining your video visit? No  {If patient encounters technical issues they should call [phone] [id number]}  Video-Visit Details    Type of service:  Video Visit   Originating Location (pt. Location): Home  {PROVIDER LOCATION On-site should be selected for visits conducted from your clinic location or adjoining [facility] hospital, academic office, or other nearby [facility] building. Off-site should be selected for all other provider locations, including home:[id number]}  Distant Location (provider location):  Off-site  Platform used for Video Visit: [employer]   Video timing 122-139      Again, thank you for allowing me to participate in the care of your patient.        Sincerely,        Kavon Null MD

## 2024-12-09 NOTE — PROGRESS NOTES
[facility]/[place]  Section of General Neurology  Return Patient  Virtual Visit    Mihaela Pro MRN# [id number]   Age: 74 year old YOB: 1950            Assessment and Plan:   Assessment:  Mihaela Pro is a pleasant 74 year old female who presents in follow up for dizziness that remains episodic and non specific.   We reviewed previously unrevealing work up in MRI brain w/w/o.  Still episodically unsteady/dizzy.  Discussed future options.  No clear triggers, she notes she is often hydrated.  She is not interested in trailing medications from a migrainous vantage point or SSRIs (fear of side effects).  Will [facility] with [facility], given meclizine a longer trial (hasn't wanted to try it too often as was dizzy on 25 mg and often on the go).       Plan:  Re trial meclizine 12.5 mg PRN  [facility]  Discussed follow up timing, settled on in the spring, she can reach out sooner with any issues questions or changes    Fernando Null MD   of Neurology   [facility]      Interval history:     Meclizine makes her too sleepy at higher doses  Some days she really struggles with how dizzy she gets.  It is discouraging  Remains quite episodic  No room spinning   Just poor balance.   Can't walk a straight line with 2 bottle of wine kind of feeling.    Discussed selective serotonin reuptake inhibitor data.    Not interested in more medications.    [facility]  Hearing aids--Kaden--she was happy with this.    Comes and goes.    Is cognizant of her hydration.    Magnesium oxide 400 mg Riboflavin (vitamin b2) 400 mg daily didn't really help  Anti migraine as needed imitrex a future option.    Pass on imitrex.    She is worried about imitrex side effects.    Prevagen--no merit, discussed.      A/P at last visit  Mihaela Pro is a pleasant 72 year old female who presents in follow up  for episodic dizziness.  As noted previously it is long standing and non specific.  Not clearly migrainous and nutritional options therein were not helpful.  Meclizine may have helped but made her sleepy.  She seems to have tried vestibular PT before to no avail.  MRI unrevealing,good news Discussed future options.  Plan as below     Plan:  --Decrease meclizine to 12.5 mg TIDPRN  --Future option: Re trial of vestibular PT, [facility], off label options such as benzodiazepines, selective serotonin reuptake inhibitors can improve dizziness in some patients  --She will reach out with issues questions or changes         Past Medical History:     Patient Active Problem List   Diagnosis    Health care maintenance    Tubular adenoma of colon    Dizziness    Allergic rhinitis    Ascending aorta dilation (H)    Fibromyalgia    Uncontrolled hypertension    Hypothyroidism    Osteopenia    Pulmonary nodule    Sensorineural hearing loss of both ears    UARS (upper airway resistance syndrome)    Colonic mass    Labial cyst     Past Medical History:   Diagnosis Date    Allergic rhinitis     Anemia     Ascending aorta dilation (H)     Colonic mass     Degenerative joint disease of left acromioclavicular joint     Fibromyalgia     Hallux limitus, right     History of blood transfusion     Hypertension     Hypothyroidism     Osteopenia     PONV (postoperative nausea and vomiting)     Primary osteoarthritis of right shoulder     Pulmonary nodules     Sensorineural hearing loss of both ears     Spondylosis of lumbar region without myelopathy or radiculopathy     Tendinopathy of left biceps tendon     Thyroid disease     Tobacco use disorder 11/22/2023    Tubular adenoma of colon     UARS (upper airway resistance syndrome)         Past Surgical History:     Past Surgical History:   Procedure Laterality Date    COLONOSCOPY N/A 03/04/2024    Procedure: COLONOSCOPY WITH POLYPECTOMY AND BIOPSY;  Surgeon: Cece Logan  Scott Juarez MD;  Location: [facility] OR    ESOPHAGOSCOPY, GASTROSCOPY, DUODENOSCOPY (EGD), COMBINED N/A 2024    Procedure: ENDOSCOPIC ULTRASOUND LOWER TRACT;  Surgeon: Abraham Pederson MD;  Location: [facility] OR    MAMMOPLASTY AUGMENTATION Bilateral 2010    replaced in ..had them before that    ORTHOPEDIC SURGERY      Shoulder surgery        Social History:     Social History     Tobacco Use    Smoking status: Former     Current packs/day: 0.00     Types: Cigarettes     Quit date: 1998     Years since quittin.9    Smokeless tobacco: Never    Tobacco comments:     Smokes very seldom   Vaping Use    Vaping status: Never Used   Substance Use Topics    Alcohol use: Yes     Comment: couple glasses of wine or liquer    Drug use: Never        Family History:     Family History   Problem Relation Age of Onset    No Known Problems Mother     No Known Problems Father     No Known Problems Sister     No Known Problems Daughter     No Known Problems Maternal Grandmother     No Known Problems Maternal Grandfather     No Known Problems Paternal Grandmother     No Known Problems Paternal Grandfather     No Known Problems Maternal Aunt     No Known Problems Paternal Aunt     Hereditary Breast and Ovarian Cancer Syndrome No family hx of     Breast Cancer No family hx of     Cancer No family hx of     Colon Cancer No family hx of     Endometrial Cancer No family hx of     Ovarian Cancer No family hx of         Medications:     Current Outpatient Medications   Medication Sig Dispense Refill    amLODIPine (NORVASC) 5 MG tablet Take 1 tablet (5 mg) by mouth daily 30 tablet 1    clobetasol (TEMOVATE) 0.05 % external ointment APPLY TO THE AFFECTED AREA OF LEG DAILY AS NEEDED.      Coenzyme Q10 100 MG/ML LIQD       losartan (COZAAR) 100 MG tablet       meclizine (ANTIVERT) 12.5 MG tablet Take 1 tablet (12.5 mg) by mouth 3 times daily as needed for dizziness. 90 tablet 5    NP THYROID 15 MG tablet Take 15  "mg by mouth every 48 hours      traZODone (DESYREL) 50 MG tablet Take 50 mg by mouth as needed for sleep       No current facility-administered medications for this visit.        Allergies:     Allergies   Allergen Reactions    Iodinated Contrast Media Anaphylaxis    Codeine Unknown     Nausea  Nausea  Nausea      Diatrizoate Other (See Comments)     Patient states \"had a serious allergic reaction\"  Patient states \"had a serious allergic reaction\"  Other reaction(s): Other - Describe In Comment Field  Patient states \"had a serious allergic reaction\"      Diltiazem Unknown     rash  rash  rash      Furosemide Unknown     petecchiae  petecchiae  petecchiae      Hydrochlorothiazide W-Triamterene      petecchiae  petecchiae      Lisinopril Cough     Cough  Cough  Other reaction(s): Cough  Cough      Other Drug Allergy (See Comments)      Other reaction(s): Other - Describe In Comment Field  Allergic reaction to IV dye for eye exam. Patient had severe sneezing, congestion, and swelling around eyes.    Red Dye #22 (Eosine) Unknown     Other reaction(s): *Unknown  Other reaction(s): *Unknown      Latex Rash     Adhesive tape with breast surgery  Adhesive tape with breast surgery  Adhesive tape with breast surgery          Review of Systems:   As noted above     Physical Exam:   General: Seated comfortably in no acute distress.  Neurologic:     Mental Status: Fully alert, attentive and oriented. Speech clear and fluent, no paraphasic errors.     Cranial Nerves: EOM appear intact. Facial movements symmetric. Hearing not formally tested but intact to conversation.  No dysarthria.     Motor: No tremors or other abnormal movements observed.      Sensory:Not able to be tested virtually     Coordination: Not tested     Gait: Not tested         Data: Pertinent prior to visit      Narrative & Impression   MRI OF THE BRAIN WITHOUT AND WITH CONTRAST July 12, 2023 12:48 PM      HISTORY: Exclude secondary dizziness. Dizziness    " (0) Performs both tasks correctly   TECHNIQUE: Axial diffusion-weighted with ADC map, axial T2-weighted  with fat saturation, axial T1-weighted, axial turboFLAIR and coronal  T1-weighted images of the brain were acquired without intravenous  contrast.  Following intravenous administration of gadolinium (6.5 mL  Gadavist), axial T1-weighted images of the brain were acquired.      COMPARISON: None.     FINDINGS: There is moderate diffuse cerebral volume loss. There are  mild patchy periventricular areas of abnormal T2 signal hyperintensity  in the cerebral white matter bilaterally that are consistent with  sequela of chronic small vessel ischemic disease.      The ventricles and basal cisterns are within normal limits in  configuration given the degree of cerebral volume loss. There is no  midline shift. There are no extra-axial fluid collections. There is no  evidence for stroke or acute intracranial hemorrhage. There is no  abnormal contrast enhancement in the brain or its coverings.      There is no sinusitis or mastoiditis.                                                                      IMPRESSION: Diffuse cerebral volume loss and cerebral white matter  changes consistent with chronic small vessel ischemic disease. No  evidence for acute intracranial pathology.                     The total time of this encounter today amounted to 30  minutes in total. This time included time spent with the patient, prep work, ordering tests, and performing post visit documentation.    The longitudinal plan of care for dizziness was addressed during this visit. Due to the added complexity in care, I will continue to support Ms Pro in the subsequent management of this condition(s) and with the ongoing continuity of care of this condition(s).

## 2024-12-09 NOTE — PROGRESS NOTES
Mihaela is a 74 year old who is being evaluated via a billable video visit.    How would you like to obtain your AVS? [employer]art  If the video visit is dropped, the invitation should be resent by: Text to cell phone: [phone]  Will anyone else be joining your video visit? No    Video-Visit Details    Type of service:  Video Visit   Originating Location (pt. Location): Home    Distant Location (provider location):  Off-site  Platform used for Video Visit: [employer] timing 122-012

## 2024-12-09 NOTE — PATIENT INSTRUCTIONS
[facility] referral.     Future options  Imitrex   Other migraine treatment options  Selective serotonin reuptake inhibitor     Follow up in 4-5 months

## 2024-12-10 ENCOUNTER — DOCUMENTATION ONLY (OUTPATIENT)
Dept: NEUROLOGY | Facility: CLINIC | Age: 74
End: 2024-12-10
Payer: MEDICARE

## 2024-12-10 ENCOUNTER — PATIENT OUTREACH (OUTPATIENT)
Dept: GASTROENTEROLOGY | Facility: CLINIC | Age: 74
End: 2024-12-10
Payer: MEDICARE

## 2024-12-10 NOTE — PROGRESS NOTES
Patient will be due for colorectal cancer screening-surveillance after the age of 74. Will not be managed by the Colorectal Cancer Screening team. PCP to continue to address. High risk episode for CRC team management being resolved.

## 2024-12-10 NOTE — PROGRESS NOTES
Faxed Form December 10, 2024 to fax number [phone], [facility].    Right Fax confirmed at 12:29 PM    Antionette Dalal

## 2025-01-02 ENCOUNTER — TRANSFERRED RECORDS (OUTPATIENT)
Dept: HEALTH INFORMATION MANAGEMENT | Facility: CLINIC | Age: 75
End: 2025-01-02

## 2025-01-22 DIAGNOSIS — I10 UNCONTROLLED HYPERTENSION: ICD-10-CM

## 2025-01-22 RX ORDER — AMLODIPINE BESYLATE 5 MG/1
5 TABLET ORAL DAILY
Qty: 90 TABLET | Refills: 0 | Status: SHIPPED | OUTPATIENT
Start: 2025-01-22

## 2025-01-31 PROBLEM — R05.1 ACUTE COUGH: Status: ACTIVE | Noted: 2025-01-31

## 2025-02-24 ENCOUNTER — TRANSFERRED RECORDS (OUTPATIENT)
Dept: HEALTH INFORMATION MANAGEMENT | Facility: CLINIC | Age: 75
End: 2025-02-24
Payer: MEDICARE

## 2025-03-07 ENCOUNTER — TRANSFERRED RECORDS (OUTPATIENT)
Dept: HEALTH INFORMATION MANAGEMENT | Facility: CLINIC | Age: 75
End: 2025-03-07
Payer: MEDICARE

## 2025-03-14 NOTE — LETTER
2019      RE: Mihaela Pro  [address] [facility] [address]     Service Date: 2019      HISTORY OF PRESENT ILLNESS:  Ms. Pro is back today.        PHYSICAL EXAMINATION:  Her sutures are out.  The lid is in good position.  She wanted some advice about blepharitis.  She is using some dilute baby shampoo and I have no other more profound tricks than that and that.        PLAN:  I am going to ask that she see her ophthalmologist.  She is going to see a dermatologist next week.  If they have any tricks, they will share them with us.  Her lid is slightly over corrected, which is exactly what we wanted.  It is in good position against the globe and the sense of tightness is markedly relieved.  She will see us again in a month or so.         GURMEET CARRERO MD             D: 2019   T: 2019   MT: ms      Name:     MIHAELA PRO   MRN:      [id number]        Account:      [id number]   :      1950           Service Date: 2019      Document: [id number]          LMOM for pt to confirm their  10:30a   appt on   3/20  w/ Steven . Reminded pt to arrive 15 mins prior to appt to check in with . Gave call back number [phone] to cancel/reschedule.

## 2025-04-08 ENCOUNTER — TRANSFERRED RECORDS (OUTPATIENT)
Dept: HEALTH INFORMATION MANAGEMENT | Facility: CLINIC | Age: 75
End: 2025-04-08
Payer: MEDICARE

## 2025-04-24 ENCOUNTER — VIRTUAL VISIT (OUTPATIENT)
Dept: NEUROLOGY | Facility: CLINIC | Age: 75
End: 2025-04-24
Payer: MEDICARE

## 2025-04-24 DIAGNOSIS — R42 DIZZINESS: Primary | ICD-10-CM

## 2025-04-24 NOTE — PROGRESS NOTES
[facility]  Section of General Neurology  Return Patient  Virtual Visit    Mihaela Pro MRN# [id number]   Age: 74 year old YOB: 1950          Assessment and Plan:   Mihaela Pro is a pleasant 74 year old female who presents in follow up for dizziness.  Work up for secondary cause conventionally has been unrevealing.  She is working hard with the [facility] and their work up has revealed unilateral vestibular dysfunction.  Therapy specifically tailored to this has been helpful. She has noticed some incremental improvement.   Mainstay of this treatment would be PT, meclizine PRN on tough days.  She is in agreement.  All questions answered.  She can reach out with any issues questions or changes in the future        Fernando Null MD   of Neurology   [facility]      Interval history:     Not using meclizine--makes her too tired    Has been working [facility] --they do very unique tests  R unilateral vestibular hypofunction was the diagnosis she was given  Visual/motor function overall good  Doing all sorts of PT  Some progress shown on repeat testing.    Golf season upcoming    Does note improvement in terms of being able to do housework for an extended period of time    A/P at last visit  Mihaela Pro is a pleasant 74 year old female who presents in follow up for dizziness that remains episodic and non specific.   We reviewed previously unrevealing work up in MRI brain w/w/o.  Still episodically unsteady/dizzy.  Discussed future options.  No clear triggers, she notes she is often hydrated.  She is not interested in trailing medications from a migrainous vantage point or SSRIs (fear of side effects).  [facility], given meclizine a longer trial (hasn't wanted to try it too often as was dizzy on 25 mg and often on the go).       Plan:  Re trial meclizine 12.5 mg PRN  [facility]  Discussed follow up timing, settled on in the spring, she can reach out sooner with any issues questions or changes         Past Medical History:     Patient Active Problem List   Diagnosis    Health care maintenance    Tubular adenoma of colon    Dizziness    Allergic rhinitis    Ascending aorta dilation    Fibromyalgia    HTN (hypertension)    Hypothyroidism    Osteopenia    Pulmonary nodule    Sensorineural hearing loss of both ears    UARS (upper airway resistance syndrome)    Colonic mass    Labial cyst    Acute cough     Past Medical History:   Diagnosis Date    Allergic rhinitis     Anemia     Ascending aorta dilation     Colonic mass     Degenerative joint disease of left acromioclavicular joint     Fibromyalgia     Hallux limitus, right     History of blood transfusion     Hypertension     Hypothyroidism     Osteopenia     PONV (postoperative nausea and vomiting)     Primary osteoarthritis of right shoulder     Pulmonary nodules     Sensorineural hearing loss of both ears     Spondylosis of lumbar region without myelopathy or radiculopathy     Tendinopathy of left biceps tendon     Thyroid disease     Tobacco use disorder 11/22/2023    Tubular adenoma of colon     UARS (upper airway resistance syndrome)         Past Surgical History:     Past Surgical History:   Procedure Laterality Date    COLONOSCOPY N/A 03/04/2024    Procedure: COLONOSCOPY WITH POLYPECTOMY AND BIOPSY;  Surgeon: Cece Logan MD;  Location: [facility] OR    ESOPHAGOSCOPY, GASTROSCOPY, DUODENOSCOPY (EGD), COMBINED N/A 5/24/2024    Procedure: ENDOSCOPIC ULTRASOUND LOWER TRACT;  Surgeon: Abraham Pederson MD;  [facility] [facility] [place]    MAMMOPLASTY AUGMENTATION Bilateral 01/01/2010    replaced in 2010..had them before that    ORTHOPEDIC SURGERY      Shoulder surgery        Social History:     Social History     Tobacco Use    Smoking status: Former     Current packs/day: 0.00     Types: Cigarettes     Quit date:  1998     Years since quittin.3     Passive exposure: Past    Smokeless tobacco: Never    Tobacco comments:     Smokes very seldom   Vaping Use    Vaping status: Never Used   Substance Use Topics    Alcohol use: Yes     Comment: couple glasses of wine or liquer    Drug use: Never        Family History:     Family History   Problem Relation Age of Onset    No Known Problems Mother     No Known Problems Father     No Known Problems Sister     No Known Problems Daughter     No Known Problems Maternal Grandmother     No Known Problems Maternal Grandfather     No Known Problems Paternal Grandmother     No Known Problems Paternal Grandfather     No Known Problems Maternal Aunt     No Known Problems Paternal Aunt     Hereditary Breast and Ovarian Cancer Syndrome No family hx of     Breast Cancer No family hx of     Cancer No family hx of     Colon Cancer No family hx of     Endometrial Cancer No family hx of     Ovarian Cancer No family hx of         Medications:     Current Outpatient Medications   Medication Sig Dispense Refill    albuterol (PROAIR HFA/PROVENTIL HFA/VENTOLIN HFA) 108 (90 Base) MCG/ACT inhaler Inhale 2 puffs into the lungs every 6 hours as needed for shortness of breath, wheezing or cough. 18 g 0    amLODIPine (NORVASC) 5 MG tablet TAKE 1 TABLET(5 MG) BY MOUTH DAILY 90 tablet 0    clobetasol (TEMOVATE) 0.05 % external ointment APPLY TO THE AFFECTED AREA OF LEG DAILY AS NEEDED.      Coenzyme Q10 100 MG/ML LIQD       fluticasone (FLOVENT HFA) 110 MCG/ACT inhaler Inhale 1 puff into the lungs 2 times daily. 12 g 0    losartan (COZAAR) 100 MG tablet       meclizine (ANTIVERT) 12.5 MG tablet Take 1 tablet (12.5 mg) by mouth 3 times daily as needed for dizziness. (Patient not taking: Reported on 2025) 90 tablet 5    NP THYROID 15 MG tablet Take 15 mg by mouth every 48 hours      NP THYROID 90 MG tablet Take 90 mg by mouth daily.      traZODone (DESYREL) 50 MG tablet Take 50 mg by mouth as needed for  "sleep       No current facility-administered medications for this visit.        Allergies:     Allergies   Allergen Reactions    Iodinated Contrast Media Anaphylaxis    Codeine Unknown     Nausea  Nausea  Nausea      Diatrizoate Other (See Comments)     Patient states \"had a serious allergic reaction\"  Patient states \"had a serious allergic reaction\"  Other reaction(s): Other - Describe In Comment Field  Patient states \"had a serious allergic reaction\"      Diltiazem Unknown     rash  rash  rash      Furosemide Unknown     petecchiae  petecchiae  petecchiae      Hydrochlorothiazide-Triamterene      petecchiae  petecchiae      Lisinopril Cough     Cough  Cough  Other reaction(s): Cough  Cough      Other Drug Allergy (See Comments)      Other reaction(s): Other - Describe In Comment Field  Allergic reaction to IV dye for eye exam. Patient had severe sneezing, congestion, and swelling around eyes.    Red Dye #22 (Eosine) Unknown     Other reaction(s): *Unknown  Other reaction(s): *Unknown      Latex Rash     Adhesive tape with breast surgery  Adhesive tape with breast surgery  Adhesive tape with breast surgery          Review of Systems:   As noted above     Physical Exam:   General: Seated comfortably in no acute distress.  Neurologic:     Mental Status: Fully alert, attentive and oriented. Speech clear and fluent, no paraphasic errors.     Cranial Nerves: Facial movements symmetric. Hearing not formally tested but intact to conversation.  No dysarthria.             Data: Pertinent prior to visit      Narrative & Impression   MRI OF THE BRAIN WITHOUT AND WITH CONTRAST July 12, 2023 12:48 PM      HISTORY: Exclude secondary dizziness. Dizziness      TECHNIQUE: Axial diffusion-weighted with ADC map, axial T2-weighted  with fat saturation, axial T1-weighted, axial turboFLAIR and coronal  T1-weighted images of the brain were acquired without intravenous  contrast.  Following intravenous administration of gadolinium (6.5 " mL  Gadavist), axial T1-weighted images of the brain were acquired.      COMPARISON: None.     FINDINGS: There is moderate diffuse cerebral volume loss. There are  mild patchy periventricular areas of abnormal T2 signal hyperintensity  in the cerebral white matter bilaterally that are consistent with  sequela of chronic small vessel ischemic disease.      The ventricles and basal cisterns are within normal limits in  configuration given the degree of cerebral volume loss. There is no  midline shift. There are no extra-axial fluid collections. There is no  evidence for stroke or acute intracranial hemorrhage. There is no  abnormal contrast enhancement in the brain or its coverings.      There is no sinusitis or mastoiditis.                                                                      IMPRESSION: Diffuse cerebral volume loss and cerebral white matter  changes consistent with chronic small vessel ischemic disease. No  evidence for acute intracranial pathology.                                     The total time of this encounter today amounted to 30 minutes in total. This time included time spent with the patient on video, prep work, reviewing data, and performing post visit documentation.

## 2025-04-24 NOTE — PROGRESS NOTES
Mihaela is a 74 year old who is being evaluated via a billable video visit.    How would you like to obtain your AVS? MyChart  If the video visit is dropped, the invitation should be resent by: Send to e-mail at: [email or web address]  Will anyone else be joining your video visit? No    Video-Visit Details    Type of service:  Video Visit   Video Start/End Time: 325-340  Originating Location (pt. Location):     Distant Location (provider location):    Platform used for Video Visit:   DONNIE Arana, [facility] ([facility])    Fernando Null MD   of Neurology  [facility]

## 2025-04-24 NOTE — LETTER
4/24/2025      Mihaela Pro  [address]  [place] [address]      Dear Colleague,    Thank you for referring your patient, Mihaela Pro, to the [facility] [place]. Please see a copy of my visit note below.    [facility]  Section of General Neurology  Return Patient  Virtual Visit    Mihaela Pro MRN# [id number]   Age: 74 year old YOB: 1950          Assessment and Plan:   Mihaela Pro is a pleasant 74 year old female who presents in follow up for dizziness.  Work up for secondary cause conventionally has been unrevealing.  She is working hard with the [facility] and their work up has revealed unilateral vestibular dysfunction.  Therapy specifically tailored to this has been helpful. She has noticed some incremental improvement.   Mainstay of this treatment would be PT, meclizine PRN on tough days.  She is in agreement.  All questions answered.  She can reach out with any issues questions or changes in the future        Fernando Null MD   of Neurology   [facility]      Interval history:     Not using meclizine--makes her too tired    Has been working [facility] --they do very unique tests  R unilateral vestibular hypofunction was the diagnosis she was given  Visual/motor function overall good  Doing all sorts of PT  Some progress shown on repeat testing.    Golf season upcoming    Does note improvement in terms of being able to do housework for an extended period of time    A/P at last visit  Mihaela Pro is a pleasant 74 year old female who presents in follow up for dizziness that remains episodic and non specific.   We reviewed previously unrevealing work up in MRI brain w/w/o.  Still episodically unsteady/dizzy.  Discussed future options.  No clear triggers, she notes she is often hydrated.  She is not interested in trailing medications from a migrainous vantage point or SSRIs (fear of side effects).   [facility], given meclizine a longer trial (hasn't wanted to try it too often as was dizzy on 25 mg and often on the go).       Plan:  Re trial meclizine 12.5 mg PRN  [facility]  Discussed follow up timing, settled on in the spring, she can reach out sooner with any issues questions or changes         Past Medical History:     Patient Active Problem List   Diagnosis     Health care maintenance     Tubular adenoma of colon     Dizziness     Allergic rhinitis     Ascending aorta dilation     Fibromyalgia     HTN (hypertension)     Hypothyroidism     Osteopenia     Pulmonary nodule     Sensorineural hearing loss of both ears     UARS (upper airway resistance syndrome)     Colonic mass     Labial cyst     Acute cough     Past Medical History:   Diagnosis Date     Allergic rhinitis      Anemia      Ascending aorta dilation      Colonic mass      Degenerative joint disease of left acromioclavicular joint      Fibromyalgia      Hallux limitus, right      History of blood transfusion      Hypertension      Hypothyroidism      Osteopenia      PONV (postoperative nausea and vomiting)      Primary osteoarthritis of right shoulder      Pulmonary nodules      Sensorineural hearing loss of both ears      Spondylosis of lumbar region without myelopathy or radiculopathy      Tendinopathy of left biceps tendon      Thyroid disease      Tobacco use disorder 11/22/2023     Tubular adenoma of colon      UARS (upper airway resistance syndrome)         Past Surgical History:     Past Surgical History:   Procedure Laterality Date     COLONOSCOPY N/A 03/04/2024    Procedure: COLONOSCOPY WITH POLYPECTOMY AND BIOPSY;  Surgeon: Cece Logan MD;  Location: [facility] OR     ESOPHAGOSCOPY, GASTROSCOPY, DUODENOSCOPY (EGD), COMBINED N/A 5/24/2024    Procedure: ENDOSCOPIC ULTRASOUND LOWER TRACT;  Surgeon: Abraham Pederson MD;  Location: [facility] OR      MAMMOPLASTY AUGMENTATION Bilateral 2010    replaced in ..had them before that     ORTHOPEDIC SURGERY      Shoulder surgery        Social History:     Social History     Tobacco Use     Smoking status: Former     Current packs/day: 0.00     Types: Cigarettes     Quit date: 1998     Years since quittin.3     Passive exposure: Past     Smokeless tobacco: Never     Tobacco comments:     Smokes very seldom   Vaping Use     Vaping status: Never Used   Substance Use Topics     Alcohol use: Yes     Comment: couple glasses of wine or liquer     Drug use: Never        Family History:     Family History   Problem Relation Age of Onset     No Known Problems Mother      No Known Problems Father      No Known Problems Sister      No Known Problems Daughter      No Known Problems Maternal Grandmother      No Known Problems Maternal Grandfather      No Known Problems Paternal Grandmother      No Known Problems Paternal Grandfather      No Known Problems Maternal Aunt      No Known Problems Paternal Aunt      Hereditary Breast and Ovarian Cancer Syndrome No family hx of      Breast Cancer No family hx of      Cancer No family hx of      Colon Cancer No family hx of      Endometrial Cancer No family hx of      Ovarian Cancer No family hx of         Medications:     Current Outpatient Medications   Medication Sig Dispense Refill     albuterol (PROAIR HFA/PROVENTIL HFA/VENTOLIN HFA) 108 (90 Base) MCG/ACT inhaler Inhale 2 puffs into the lungs every 6 hours as needed for shortness of breath, wheezing or cough. 18 g 0     amLODIPine (NORVASC) 5 MG tablet TAKE 1 TABLET(5 MG) BY MOUTH DAILY 90 tablet 0     clobetasol (TEMOVATE) 0.05 % external ointment APPLY TO THE AFFECTED AREA OF LEG DAILY AS NEEDED.       Coenzyme Q10 100 MG/ML LIQD        fluticasone (FLOVENT HFA) 110 MCG/ACT inhaler Inhale 1 puff into the lungs 2 times daily. 12 g 0     losartan (COZAAR) 100 MG tablet        meclizine (ANTIVERT) 12.5 MG tablet Take 1  "tablet (12.5 mg) by mouth 3 times daily as needed for dizziness. (Patient not taking: Reported on 1/31/2025) 90 tablet 5     NP THYROID 15 MG tablet Take 15 mg by mouth every 48 hours       NP THYROID 90 MG tablet Take 90 mg by mouth daily.       traZODone (DESYREL) 50 MG tablet Take 50 mg by mouth as needed for sleep       No current facility-administered medications for this visit.        Allergies:     Allergies   Allergen Reactions     Iodinated Contrast Media Anaphylaxis     Codeine Unknown     Nausea  Nausea  Nausea       Diatrizoate Other (See Comments)     Patient states \"had a serious allergic reaction\"  Patient states \"had a serious allergic reaction\"  Other reaction(s): Other - Describe In Comment Field  Patient states \"had a serious allergic reaction\"       Diltiazem Unknown     rash  rash  rash       Furosemide Unknown     petecchiae  petecchiae  petecchiae       Hydrochlorothiazide-Triamterene      petecchiae  petecchiae       Lisinopril Cough     Cough  Cough  Other reaction(s): Cough  Cough       Other Drug Allergy (See Comments)      Other reaction(s): Other - Describe In Comment Field  Allergic reaction to IV dye for eye exam. Patient had severe sneezing, congestion, and swelling around eyes.     Red Dye #22 (Eosine) Unknown     Other reaction(s): *Unknown  Other reaction(s): *Unknown       Latex Rash     Adhesive tape with breast surgery  Adhesive tape with breast surgery  Adhesive tape with breast surgery          Review of Systems:   As noted above     Physical Exam:   General: Seated comfortably in no acute distress.  Neurologic:     Mental Status: Fully alert, attentive and oriented. Speech clear and fluent, no paraphasic errors.     Cranial Nerves: Facial movements symmetric. Hearing not formally tested but intact to conversation.  No dysarthria.             Data: Pertinent prior to visit      Narrative & Impression   MRI OF THE BRAIN WITHOUT AND WITH CONTRAST July 12, 2023 12:48 PM    "   HISTORY: Exclude secondary dizziness. Dizziness      TECHNIQUE: Axial diffusion-weighted with ADC map, axial T2-weighted  with fat saturation, axial T1-weighted, axial turboFLAIR and coronal  T1-weighted images of the brain were acquired without intravenous  contrast.  Following intravenous administration of gadolinium (6.5 mL  Gadavist), axial T1-weighted images of the brain were acquired.      COMPARISON: None.     FINDINGS: There is moderate diffuse cerebral volume loss. There are  mild patchy periventricular areas of abnormal T2 signal hyperintensity  in the cerebral white matter bilaterally that are consistent with  sequela of chronic small vessel ischemic disease.      The ventricles and basal cisterns are within normal limits in  configuration given the degree of cerebral volume loss. There is no  midline shift. There are no extra-axial fluid collections. There is no  evidence for stroke or acute intracranial hemorrhage. There is no  abnormal contrast enhancement in the brain or its coverings.      There is no sinusitis or mastoiditis.                                                                      IMPRESSION: Diffuse cerebral volume loss and cerebral white matter  changes consistent with chronic small vessel ischemic disease. No  evidence for acute intracranial pathology.                                     The total time of this encounter today amounted to 30 minutes in total. This time included time spent with the patient on video, prep work, reviewing data, and performing post visit documentation.        Mihaela is a 74 year old who is being evaluated via a billable video visit.    How would you like to obtain your AVS? MyChart  If the video visit is dropped, the invitation should be resent by: Send to e-mail at: [email or web address]  Will anyone else be joining your video visit? No    Video-Visit Details    Type of service:  Video Visit   Video Start/End Time: 325-340  Originating Location (pt.  Location):     Distant Location (provider location):    Platform used for Video Visit:   DONNIE Arana, JEREL ([facility])    Fernando Null MD   of Neurology  [facility]          Again, thank you for allowing me to participate in the care of your patient.        Sincerely,        Kavon Null MD    Electronically signed

## 2025-05-29 ENCOUNTER — TELEPHONE (OUTPATIENT)
Dept: FAMILY MEDICINE | Facility: CLINIC | Age: 75
End: 2025-05-29
Payer: MEDICARE

## 2025-05-29 NOTE — TELEPHONE ENCOUNTER
Patient Quality Outreach    Patient is due for the following:   Hypertension -  BP check    Action(s) Taken:   No follow up needed at this time.    Type of outreach:    Chart review performed, no outreach needed. Patient was seen by outside Cardiology 4/29/25. Updated in Care Everywhere. BP updated in patient-reported vitals.    Questions for provider review:    None         Niecy Hernandez  Chart routed to None.

## 2025-07-09 DIAGNOSIS — I10 UNCONTROLLED HYPERTENSION: ICD-10-CM

## 2025-07-09 RX ORDER — AMLODIPINE BESYLATE 5 MG/1
5 TABLET ORAL DAILY
Qty: 90 TABLET | Refills: 0 | Status: SHIPPED | OUTPATIENT
Start: 2025-07-09

## 2025-08-14 ENCOUNTER — TELEPHONE (OUTPATIENT)
Dept: FAMILY MEDICINE | Facility: CLINIC | Age: 75
End: 2025-08-14
Payer: MEDICARE

## 2025-08-16 ENCOUNTER — HEALTH MAINTENANCE LETTER (OUTPATIENT)
Age: 75
End: 2025-08-16